# Patient Record
Sex: FEMALE | Race: WHITE | NOT HISPANIC OR LATINO | Employment: FULL TIME | URBAN - METROPOLITAN AREA
[De-identification: names, ages, dates, MRNs, and addresses within clinical notes are randomized per-mention and may not be internally consistent; named-entity substitution may affect disease eponyms.]

---

## 2017-12-19 ENCOUNTER — TRANSFERRED RECORDS (OUTPATIENT)
Dept: HEALTH INFORMATION MANAGEMENT | Facility: CLINIC | Age: 57
End: 2017-12-19

## 2017-12-19 LAB — PAP-ABSTRACT: NORMAL

## 2020-07-28 ENCOUNTER — OFFICE VISIT (OUTPATIENT)
Dept: FAMILY MEDICINE | Facility: CLINIC | Age: 60
End: 2020-07-28
Payer: COMMERCIAL

## 2020-07-28 ENCOUNTER — TELEPHONE (OUTPATIENT)
Dept: FAMILY MEDICINE | Facility: CLINIC | Age: 60
End: 2020-07-28

## 2020-07-28 VITALS
DIASTOLIC BLOOD PRESSURE: 84 MMHG | BODY MASS INDEX: 20.65 KG/M2 | TEMPERATURE: 98.7 F | HEIGHT: 69 IN | HEART RATE: 74 BPM | SYSTOLIC BLOOD PRESSURE: 144 MMHG | RESPIRATION RATE: 14 BRPM | OXYGEN SATURATION: 98 % | WEIGHT: 139.4 LBS

## 2020-07-28 DIAGNOSIS — I10 ESSENTIAL HYPERTENSION, BENIGN: ICD-10-CM

## 2020-07-28 DIAGNOSIS — N95.1 PERIMENOPAUSAL: ICD-10-CM

## 2020-07-28 DIAGNOSIS — L71.0 PERIORAL DERMATITIS: ICD-10-CM

## 2020-07-28 DIAGNOSIS — R21 RASH: ICD-10-CM

## 2020-07-28 DIAGNOSIS — F41.9 ANXIETY: ICD-10-CM

## 2020-07-28 DIAGNOSIS — I10 ESSENTIAL HYPERTENSION, BENIGN: Primary | ICD-10-CM

## 2020-07-28 DIAGNOSIS — Z12.39 ENCOUNTER FOR SCREENING FOR MALIGNANT NEOPLASM OF BREAST: ICD-10-CM

## 2020-07-28 PROCEDURE — 36415 COLL VENOUS BLD VENIPUNCTURE: CPT | Performed by: FAMILY MEDICINE

## 2020-07-28 PROCEDURE — 99203 OFFICE O/P NEW LOW 30 MIN: CPT | Performed by: FAMILY MEDICINE

## 2020-07-28 PROCEDURE — 80048 BASIC METABOLIC PNL TOTAL CA: CPT | Performed by: FAMILY MEDICINE

## 2020-07-28 RX ORDER — LOSARTAN POTASSIUM 25 MG/1
12.5 TABLET ORAL DAILY
Qty: 30 TABLET | Refills: 0 | Status: SHIPPED | OUTPATIENT
Start: 2020-07-28 | End: 2021-02-24

## 2020-07-28 RX ORDER — SPIRONOLACTONE 50 MG/1
50 TABLET, FILM COATED ORAL DAILY
Qty: 30 TABLET | Refills: 0 | Status: SHIPPED | OUTPATIENT
Start: 2020-07-28 | End: 2020-07-28

## 2020-07-28 RX ORDER — LOSARTAN POTASSIUM 25 MG/1
25 TABLET ORAL DAILY
Qty: 30 TABLET | Refills: 0 | Status: SHIPPED | OUTPATIENT
Start: 2020-07-28 | End: 2020-07-28

## 2020-07-28 RX ORDER — LOSARTAN POTASSIUM 25 MG/1
12.5 TABLET ORAL DAILY
Qty: 30 TABLET | Refills: 0 | Status: SHIPPED | OUTPATIENT
Start: 2020-07-28 | End: 2020-07-28

## 2020-07-28 RX ORDER — LOSARTAN POTASSIUM 25 MG/1
25 TABLET ORAL DAILY
COMMUNITY
End: 2020-07-28

## 2020-07-28 RX ORDER — SPIRONOLACTONE 50 MG/1
50 TABLET, FILM COATED ORAL DAILY
Qty: 30 TABLET | Refills: 0 | Status: SHIPPED | OUTPATIENT
Start: 2020-07-28 | End: 2020-09-09

## 2020-07-28 ASSESSMENT — ANXIETY QUESTIONNAIRES
5. BEING SO RESTLESS THAT IT IS HARD TO SIT STILL: SEVERAL DAYS
7. FEELING AFRAID AS IF SOMETHING AWFUL MIGHT HAPPEN: SEVERAL DAYS
GAD7 TOTAL SCORE: 9
6. BECOMING EASILY ANNOYED OR IRRITABLE: SEVERAL DAYS
1. FEELING NERVOUS, ANXIOUS, OR ON EDGE: MORE THAN HALF THE DAYS
IF YOU CHECKED OFF ANY PROBLEMS ON THIS QUESTIONNAIRE, HOW DIFFICULT HAVE THESE PROBLEMS MADE IT FOR YOU TO DO YOUR WORK, TAKE CARE OF THINGS AT HOME, OR GET ALONG WITH OTHER PEOPLE: SOMEWHAT DIFFICULT
2. NOT BEING ABLE TO STOP OR CONTROL WORRYING: SEVERAL DAYS
3. WORRYING TOO MUCH ABOUT DIFFERENT THINGS: MORE THAN HALF THE DAYS

## 2020-07-28 ASSESSMENT — PATIENT HEALTH QUESTIONNAIRE - PHQ9: 5. POOR APPETITE OR OVEREATING: SEVERAL DAYS

## 2020-07-28 ASSESSMENT — PAIN SCALES - GENERAL: PAINLEVEL: NO PAIN (0)

## 2020-07-28 ASSESSMENT — MIFFLIN-ST. JEOR: SCORE: 1277.72

## 2020-07-28 NOTE — TELEPHONE ENCOUNTER
A.S.    Pharmacy called.  See message below.    Need clarification on Losartan directions.    Thanks,  Cassia Shah RN

## 2020-07-28 NOTE — LETTER
July 31, 2020      Aziza Vegas  [address]  [address]  [place] [address]        Dear ,    We are writing to inform you of your test results.    Dear Aziza Washington meeting you in recent clinic encounter .     Normal electrolytes and kidney function tests.   Glucose is slightly high but since you were not fasting- it can be repeated in follow up     Please keep us posted with questions or concerns .       Best Regards,     Yen Torres MD   [facility]   [phone]     Resulted Orders   Basic metabolic panel  (Ca, Cl, CO2, Creat, Gluc, K, Na, BUN)   Result Value Ref Range    Sodium 137 133 - 144 mmol/L    Potassium 4.0 3.4 - 5.3 mmol/L    Chloride 105 94 - 109 mmol/L    Carbon Dioxide 25 20 - 32 mmol/L    Anion Gap 7 3 - 14 mmol/L    Glucose 103 (H) 70 - 99 mg/dL    Urea Nitrogen 13 7 - 30 mg/dL    Creatinine 0.65 0.52 - 1.04 mg/dL    GFR Estimate >90 >60 mL/min/[1.73_m2]      Comment:      Non  GFR Calc  Starting 12/18/2018, serum creatinine based estimated GFR (eGFR) will be   calculated using the Chronic Kidney Disease Epidemiology Collaboration   (CKD-EPI) equation.      GFR Estimate If Black >90 >60 mL/min/[1.73_m2]      Comment:       GFR Calc  Starting 12/18/2018, serum creatinine based estimated GFR (eGFR) will be   calculated using the Chronic Kidney Disease Epidemiology Collaboration   (CKD-EPI) equation.      Calcium 9.2 8.5 - 10.1 mg/dL       If you have any questions or concerns, please call the clinic at the number listed above.       Sincerely,        Yen Torres MD

## 2020-07-28 NOTE — PATIENT INSTRUCTIONS
UMP: Dermatology Clinic [facility] [phone]   [email or web address]   FHN:  [facility] [phone]   [email or web address]  FHN: [facility] [phone]  [email or web address]    PLEASE CALL TO SCHEDULE YOUR MAMMOGRAM  [facility] [phone]  [facility] [phone]  [facility]   [phone]

## 2020-07-28 NOTE — TELEPHONE ENCOUNTER
Reason for Call:  Medication or medication refill:    Do you use a [facility]?  Name of the pharmacy and phone number for the current request:  [id number] [place] [address] [address] AT [facility]    Name of the medication requested: losartan (COZAAR) 25 MG tablet   &  spironolactone (ALDACTONE) 50 MG tablet     Other request: Pt states that we send the above Rx's to the wrong pharma. Please send to the listed pharmacy    Can we leave a detailed message on this number? YES    Phone number patient can be reached at: Cell number on file:    Telephone Information:   Mobile [phone]     Best Time: any    Call taken on 7/28/2020 at 4:11 PM by Yuko Babb

## 2020-07-28 NOTE — PROGRESS NOTES
"Subjective     Aziza Vegas is a 59 year old female who presents to clinic today for the following health issues:    HPI       New Patient/Transfer of Care  Patient is new to [facility], was previously being seen at [facility], please see care everywhere for records.  Patient is requesting refills on spironolactone and losartan    Losartan added 3 yrs ago to control Blood pressure .takes 1/2 tab of 25 mg- and that helps with Blood pressure. No side effects  Needs refill.        She reports, also need refill on spirolactone 50 mg once daily     she been on spironolactone for Blood pressure for many years  and then derm also advised it to be continued  to keep ?skin clear. No acne    Has perioral dermatitis- responded to doxycyline but does not want to continue- now flares up time to time     Use doxy for perioral dermatitis it helped but would like not that to be continued    A lesion on - right nasal bridge- a small lesion- that she picks, and it bleeds and never goes away for past 6 months     No periods since past 6 months  Noted more anxiety than before. Wondering if related to hormones. Able to manage anxiety ok. As it is not debiitating  Not intersted in counsleing  And has very active life style        BP Readings from Last 3 Encounters:   07/28/20 (!) 144/84   03/13/15 121/89    Wt Readings from Last 3 Encounters:   07/28/20 63.2 kg (139 lb 6.4 oz)   03/13/15 57.2 kg (126 lb)                    Reviewed and updated as needed this visit by Provider  Meds  Problems         Review of Systems   Constitutional, HEENT, cardiovascular, pulmonary, GI, , musculoskeletal, neuro, skin, endocrine and psych systems are negative, except as otherwise noted.      Objective    BP (!) 144/84 (BP Location: Left arm, Patient Position: Sitting, Cuff Size: Adult Regular)   Pulse 74   Temp 98.7  F (37.1  C) (Oral)   Resp 14   Ht 1.746 m (5' 8.75\")   Wt 63.2 kg (139 lb 6.4 oz)   LMP  (LMP Unknown)   SpO2 98%  "  BMI 20.74 kg/m    Body mass index is 20.74 kg/m .  Physical Exam   GENERAL: healthy, alert and no distress  NECK: no adenopathy, no asymmetry, masses, or scars and thyroid normal to palpation  RESP: lungs clear to auscultation - no rales, rhonchi or wheezes  CV: regular rate and rhythm, normal S1 S2, no S3 or S4, no murmur, click or rub, no peripheral edema and peripheral pulses strong  ABDOMEN: soft, nontender, no hepatosplenomegaly, no masses and bowel sounds normal  MS: no gross musculoskeletal defects noted, no edema  Psych: Alert and oriented times 3; coherent speech, normal   rate and volume, able to articulate logical thoughts, able   to abstract reason, no tangential thoughts, no hallucinations   or delusions   THOMPSON-7 SCORE 7/28/2020   Total Score 9         Diagnostic Test Results:  Labs reviewed in Epic  No results found for this or any previous visit (from the past 24 hour(s)).        Assessment & Plan       ICD-10-CM    1. Essential hypertension, benign  I10 Basic metabolic panel  (Ca, Cl, CO2, Creat, Gluc, K, Na, BUN)     spironolactone (ALDACTONE) 50 MG tablet     losartan (COZAAR) 25 MG tablet     DISCONTINUED: losartan (COZAAR) 25 MG tablet    uncontrolled- missed medication today- will resume spinolactone and losratan as before. FUP in 2-4 weeks.   2. Rash  R21 DERMATOLOGY REFERRAL    right nasal bridge- a small lesion- that she picks, and it bleeds and never goes away for past 6 months - I have advised ot be seen by DERM for biopsy & further   3. Perioral dermatitis  L71.0 DERMATOLOGY REFERRAL    Doxy helped but does not want to continue   4. Anxiety  F41.9     does not want counseling  will try on own- OTC herbal based medicine and keep us posted with unresolved concerns   5. Encounter for screening for malignant neoplasm of breast  Z12.39 MA Screen Bilateral w/Azael    screening mammogram in august   6. Perimenopausal  N95.1    58 yo female , known hypertension- refill given for both losrartan  12.5 mg once daily and spironolactone 50 mg once daily .  I think in future- Blood pressure can be controlled with just increasing spirolactone to 100 mg once daily and stop losartan- patient declined the change today  Will check baseline blood test and follow up in 2-4 weeks. Need complete physical as well           Regular exercise    Return in about 2 weeks (around 8/11/2020) for routine physical.    Yen Torres MD  [facility]

## 2020-07-28 NOTE — TELEPHONE ENCOUNTER
Reason for Call:  Medication or medication refill:    Do you use a [facility]?  Name of the pharmacy and phone number for the current request:      [facility][id number] - [place], [place] - [address] AT [facility] [address]      Name of the medication requested:   losartan (COZAAR) 25 MG tablet  30 tablet       Other request:   Clarify instructions  Sig - Route: Take 1 tablet (25 mg) by mouth daily 1/2 tablet daily - Oral   Can we leave a detailed message on this number? YES    Phone number patient's pharmacy can be reached at: Other phone number:  [facility] [phone]    Best Time: any    Call taken on 7/28/2020 at 1:16 PM by Emily Monroy

## 2020-07-29 LAB
ANION GAP SERPL CALCULATED.3IONS-SCNC: 7 MMOL/L (ref 3–14)
BUN SERPL-MCNC: 13 MG/DL (ref 7–30)
CALCIUM SERPL-MCNC: 9.2 MG/DL (ref 8.5–10.1)
CHLORIDE SERPL-SCNC: 105 MMOL/L (ref 94–109)
CO2 SERPL-SCNC: 25 MMOL/L (ref 20–32)
CREAT SERPL-MCNC: 0.65 MG/DL (ref 0.52–1.04)
GFR SERPL CREATININE-BSD FRML MDRD: >90 ML/MIN/{1.73_M2}
GLUCOSE SERPL-MCNC: 103 MG/DL (ref 70–99)
POTASSIUM SERPL-SCNC: 4 MMOL/L (ref 3.4–5.3)
SODIUM SERPL-SCNC: 137 MMOL/L (ref 133–144)

## 2020-07-29 ASSESSMENT — ANXIETY QUESTIONNAIRES: GAD7 TOTAL SCORE: 9

## 2020-07-29 NOTE — RESULT ENCOUNTER NOTE
Send lab & letter    Dear Aziza Washington meeting you in recent clinic encounter .    Normal electrolytes and kidney function tests.  Glucose is slightly high but since you were not fasting- it can be repeated in follow up     Please keep us posted with questions or concerns .      Best Regards,    Yen Torres MD  [facility]  [phone]

## 2020-09-08 DIAGNOSIS — I10 ESSENTIAL HYPERTENSION, BENIGN: ICD-10-CM

## 2020-09-09 RX ORDER — SPIRONOLACTONE 50 MG/1
TABLET, FILM COATED ORAL
Qty: 30 TABLET | Refills: 0 | Status: SHIPPED | OUTPATIENT
Start: 2020-09-09 | End: 2020-10-21

## 2020-09-09 NOTE — TELEPHONE ENCOUNTER
A.S.    Patient called back.  States she hasn't checked her blood pressure.  Does not have a machine at home.    Cassia Shah RN

## 2020-09-09 NOTE — TELEPHONE ENCOUNTER
FS,   Please advise in A.S' absence  Routing refill request to provider for review/approval because:  Last B/P above goal of 140/90  Gay GRAHAM RN

## 2020-09-10 NOTE — TELEPHONE ENCOUNTER
Left non detailed VM for pt asking that they callback and schedule nurse only or appt with PCP  Gay GRAHAM RN

## 2020-09-10 NOTE — TELEPHONE ENCOUNTER
Please advise  return office visit with provider -to make sure Blood pressure ok. Was high in OV-July 28.  She can make nurse only appointment as well- but if Blood pressure not at target- will need provider encounter. Thanks

## 2020-10-21 DIAGNOSIS — I10 ESSENTIAL HYPERTENSION, BENIGN: ICD-10-CM

## 2020-10-21 NOTE — TELEPHONE ENCOUNTER
Reason for Call:  Medication or medication refill:    Do you use a [facility]?  Name of the pharmacy and phone number for the current request:  [facility][id number] - [place], [place] [address] [address] AT [facility] [address]    Name of the medication requested: spironolactone (ALDACTONE) 50 MG tablet     Other request: Pt is wondering why she needs to have an chanda't to get this filled. Ideally does not want to have an chanda't. Is out of Rx and needs it asap    Can we leave a detailed message on this number? YES    Phone number patient can be reached at: Cell number on file:    Telephone Information:   Mobile [phone]     Best Time: any    Call taken on 10/21/2020 at 10:06 AM by Yuko Babb

## 2020-10-22 NOTE — TELEPHONE ENCOUNTER
ELENA.S  Please see below note  Spoke with pt and she says she has been checking her BPs at work and they have been 124/79 to 129/83. Would like to avoid coming in due to COVID. Let her know about video visits as an FYI.  Last OV 7/28/20    Pt is out of medication  Ok to refill spironolactone for 90?  Pended, Please approve if appropriate  Tanvi Coyle RN

## 2020-10-23 RX ORDER — SPIRONOLACTONE 50 MG/1
50 TABLET, FILM COATED ORAL DAILY
Qty: 90 TABLET | Refills: 0 | Status: SHIPPED | OUTPATIENT
Start: 2020-10-23 | End: 2021-01-19

## 2020-11-08 ENCOUNTER — HEALTH MAINTENANCE LETTER (OUTPATIENT)
Age: 60
End: 2020-11-08

## 2021-01-19 ENCOUNTER — TELEPHONE (OUTPATIENT)
Dept: FAMILY MEDICINE | Facility: CLINIC | Age: 61
End: 2021-01-19

## 2021-01-19 DIAGNOSIS — I10 ESSENTIAL HYPERTENSION, BENIGN: ICD-10-CM

## 2021-01-19 RX ORDER — SPIRONOLACTONE 50 MG/1
50 TABLET, FILM COATED ORAL DAILY
Qty: 30 TABLET | Refills: 0 | Status: SHIPPED | OUTPATIENT
Start: 2021-01-19 | End: 2021-02-24

## 2021-01-19 RX ORDER — SPIRONOLACTONE 50 MG/1
TABLET, FILM COATED ORAL
Start: 2021-01-19

## 2021-01-19 NOTE — TELEPHONE ENCOUNTER
Spironolactone:  Medication is being filled for 1 time refill only due to:  Patient needs to be seen because due for physical.     Cassia Shah RN

## 2021-01-19 NOTE — TELEPHONE ENCOUNTER
Reason for Call:  Medication or medication refill:spironolactone going to [place] for 2 weeks and will schedule when she returns. Please fill. Request a call back    Do you use a [facility]?  Name of the pharmacy and phone number for the current request:  [facility][id number] [place] [address] [address] AT [facility]    Name of the medication requested:     Other request:     Can we leave a detailed message on this number? YES    Phone number patient can be reached at: Home number on file [phone] (home)    Best Time: any    Call taken on 1/19/2021 at 10:44 AM by Mary Alice Burch

## 2021-02-24 ENCOUNTER — OFFICE VISIT (OUTPATIENT)
Dept: FAMILY MEDICINE | Facility: CLINIC | Age: 61
End: 2021-02-24
Payer: COMMERCIAL

## 2021-02-24 VITALS
SYSTOLIC BLOOD PRESSURE: 129 MMHG | DIASTOLIC BLOOD PRESSURE: 82 MMHG | RESPIRATION RATE: 20 BRPM | WEIGHT: 141.5 LBS | TEMPERATURE: 97.5 F | OXYGEN SATURATION: 99 % | BODY MASS INDEX: 20.96 KG/M2 | HEIGHT: 69 IN | HEART RATE: 75 BPM

## 2021-02-24 DIAGNOSIS — Z12.31 ENCOUNTER FOR SCREENING MAMMOGRAM FOR MALIGNANT NEOPLASM OF BREAST: ICD-10-CM

## 2021-02-24 DIAGNOSIS — Z12.4 SCREENING FOR CERVICAL CANCER: ICD-10-CM

## 2021-02-24 DIAGNOSIS — Z12.11 COLON CANCER SCREENING: ICD-10-CM

## 2021-02-24 DIAGNOSIS — I10 ESSENTIAL HYPERTENSION, BENIGN: Primary | ICD-10-CM

## 2021-02-24 DIAGNOSIS — R23.2 HOT FLASHES: ICD-10-CM

## 2021-02-24 LAB
ANION GAP SERPL CALCULATED.3IONS-SCNC: 3 MMOL/L (ref 3–14)
BUN SERPL-MCNC: 12 MG/DL (ref 7–30)
CALCIUM SERPL-MCNC: 10.1 MG/DL (ref 8.5–10.1)
CHLORIDE SERPL-SCNC: 105 MMOL/L (ref 94–109)
CHOLEST SERPL-MCNC: 234 MG/DL
CO2 SERPL-SCNC: 28 MMOL/L (ref 20–32)
CREAT SERPL-MCNC: 0.69 MG/DL (ref 0.52–1.04)
GFR SERPL CREATININE-BSD FRML MDRD: >90 ML/MIN/{1.73_M2}
GLUCOSE SERPL-MCNC: 99 MG/DL (ref 70–99)
HDLC SERPL-MCNC: 120 MG/DL
LDLC SERPL CALC-MCNC: 100 MG/DL
NONHDLC SERPL-MCNC: 114 MG/DL
POTASSIUM SERPL-SCNC: 4.3 MMOL/L (ref 3.4–5.3)
SODIUM SERPL-SCNC: 136 MMOL/L (ref 133–144)
TRIGL SERPL-MCNC: 71 MG/DL
TSH SERPL DL<=0.005 MIU/L-ACNC: 0.89 MU/L (ref 0.4–4)

## 2021-02-24 PROCEDURE — 99214 OFFICE O/P EST MOD 30 MIN: CPT | Performed by: FAMILY MEDICINE

## 2021-02-24 PROCEDURE — 80061 LIPID PANEL: CPT | Performed by: FAMILY MEDICINE

## 2021-02-24 PROCEDURE — 36415 COLL VENOUS BLD VENIPUNCTURE: CPT | Performed by: FAMILY MEDICINE

## 2021-02-24 PROCEDURE — 84443 ASSAY THYROID STIM HORMONE: CPT | Performed by: FAMILY MEDICINE

## 2021-02-24 PROCEDURE — 80048 BASIC METABOLIC PNL TOTAL CA: CPT | Performed by: FAMILY MEDICINE

## 2021-02-24 RX ORDER — SPIRONOLACTONE 50 MG/1
50 TABLET, FILM COATED ORAL DAILY
Qty: 90 TABLET | Refills: 3 | Status: SHIPPED | OUTPATIENT
Start: 2021-02-24 | End: 2021-09-21

## 2021-02-24 ASSESSMENT — MIFFLIN-ST. JEOR: SCORE: 1282.25

## 2021-02-24 NOTE — PROGRESS NOTES
Assessment & Plan     Essential hypertension, benign  Controlled, continue current medication   - spironolactone (ALDACTONE) 50 MG tablet  Dispense: 90 tablet; Refill: 3  - Basic metabolic panel  (Ca, Cl, CO2, Creat, Gluc, K, Na, BUN)  - Lipid Profile (Chol, Trig, HDL, LDL calc)    Encounter for screening mammogram for malignant neoplasm of breast  - MA Screen Bilateral w/Azael  Counseling provided for breast cancer screening.    Perimenopausal / menopausal hot flashes  last periods 11 month ago. hot flahes angela at night- tolerable  So far.  Discussed to wear layers and stay active, over the counter phyt oestrogen- see if that helps  Follow up as needed    Took thyroid meds in early years and since then none    Colon cancer screening  Colonoscopy up to date  10/2015, next due 03/2025 -    Screening for cervical cancer  HPV- & NIL pap 12/2017- recheck ok in 12/22      30 minutes spent on the date of the encounter doing chart review, history and exam, documentation and further activities as noted above    Return in about 1 year (around 2/24/2022) for routine physical.    Yen Torres MD  [facility]    Dianna Ervin is a 60 year old who presents for the following health issues     HPI     Medication Followup of spironolactone    Taking Medication as prescribed: yes    Side Effects:  None    Medication Helping Symptoms:  yes      Hypertension Follow-up      Do you check your blood pressure regularly outside of the clinic? Yes     Are you following a low salt diet? Yes    Are your blood pressures ever more than 140 on the top number (systolic) OR more   than 90 on the bottom number (diastolic), for example 140/90? No      How many servings of fruits and vegetables do you eat daily?  2-3    On average, how many sweetened beverages do you drink each day (Examples: soda, juice, sweet tea, etc.  Do NOT count diet or artificially sweetened beverages)?   0    How many days per week do you  "exercise enough to make your heart beat faster? 5    How many minutes a day do you exercise enough to make your heart beat faster? 60 or more    How many days per week do you miss taking your medication? 0       Pt did have pap 12/19/17 at [facility] - sent to abstract.         Review of Systems  No periods for 11 months . Hot flashes on and off- 1 yr.  Constitutional, HEENT, cardiovascular, pulmonary, GI, , musculoskeletal, neuro, skin, endocrine and psych systems are negative, except as otherwise noted.      Objective    /82 (Patient Position: Sitting, Cuff Size: Adult Regular)   Pulse 75   Temp 97.5  F (36.4  C) (Temporal)   Resp 20   Ht 1.746 m (5' 8.75\")   Wt 64.2 kg (141 lb 8 oz)   SpO2 99%   BMI 21.05 kg/m    Body mass index is 21.05 kg/m .  Physical Exam   GENERAL: healthy, alert and no distress  NECK: no adenopathy, no asymmetry, masses, or scars and thyroid normal to palpation  RESP: lungs clear to auscultation - no rales, rhonchi or wheezes  CV: regular rate and rhythm, normal S1 S2, no S3 or S4, no murmur, click or rub, no peripheral edema and peripheral pulses strong  ABDOMEN: soft, nontender, no hepatosplenomegaly, no masses and bowel sounds normal  PSYCH: mentation appears normal, affect normal/bright                "

## 2021-02-25 NOTE — RESULT ENCOUNTER NOTE
Hi    -Cholesterol levels  Triglycerides)are normal.  LDL (bad ) & cholesterol  slightly high but good diet and excercise is all you need   ADVISE: rechecking in 1 year.   -Kidney function is normal (Cr, GFR), Sodium is normal, Potassium is normal, Calcium is normal, Glucose is normal.   -TSH (thyroid stimulating hormone) level is normal which indicates normal thyroid function.

## 2021-03-10 ENCOUNTER — HOSPITAL ENCOUNTER (OUTPATIENT)
Dept: MAMMOGRAPHY | Facility: CLINIC | Age: 61
Discharge: HOME | End: 2021-03-10
Attending: FAMILY MEDICINE | Admitting: FAMILY MEDICINE
Payer: COMMERCIAL

## 2021-03-10 DIAGNOSIS — Z12.31 ENCOUNTER FOR SCREENING MAMMOGRAM FOR MALIGNANT NEOPLASM OF BREAST: ICD-10-CM

## 2021-03-10 PROCEDURE — 77063 BREAST TOMOSYNTHESIS BI: CPT

## 2021-03-27 ENCOUNTER — HEALTH MAINTENANCE LETTER (OUTPATIENT)
Age: 61
End: 2021-03-27

## 2021-08-30 ENCOUNTER — LAB REQUISITION (OUTPATIENT)
Dept: LAB | Facility: CLINIC | Age: 61
End: 2021-08-30

## 2021-08-30 LAB — HBV SURFACE AB SERPL IA-ACNC: 26.8 M[IU]/ML

## 2021-08-30 PROCEDURE — 86706 HEP B SURFACE ANTIBODY: CPT | Performed by: INTERNAL MEDICINE

## 2021-08-30 PROCEDURE — 86481 TB AG RESPONSE T-CELL SUSP: CPT | Performed by: INTERNAL MEDICINE

## 2021-08-31 LAB
GAMMA INTERFERON BACKGROUND BLD IA-ACNC: 0.05 IU/ML
M TB IFN-G BLD-IMP: NEGATIVE
M TB IFN-G CD4+ BCKGRND COR BLD-ACNC: 9.95 IU/ML
MITOGEN IGNF BCKGRD COR BLD-ACNC: 0.19 IU/ML
MITOGEN IGNF BCKGRD COR BLD-ACNC: 0.32 IU/ML
QUANTIFERON MITOGEN: 10 IU/ML
QUANTIFERON NIL TUBE: 0.05 IU/ML
QUANTIFERON TB1 TUBE: 0.24 IU/ML
QUANTIFERON TB2 TUBE: 0.37

## 2021-09-11 ENCOUNTER — HEALTH MAINTENANCE LETTER (OUTPATIENT)
Age: 61
End: 2021-09-11

## 2021-09-20 NOTE — PATIENT INSTRUCTIONS
We can check for diabetes and high cholesterol if you come fasting for 10 hours during which time you can drink water only but no other food or fluids, including snacks. You do need lab only appointment so call ahead of time.          Call to schedule your imaging:    Critical access hospital [phone]    [facility] [phone]      Add vitamin d 1000 international unit with calcium 1500 mg once daily or bedtime   Stay active- yoga/ weight bearing excercise good for bone health and general well being    Preventive Health Recommendations  Female Ages 50 - 64    Yearly exam: See your health care provider every year in order to  o Review health changes.   o Discuss preventive care.    o Review your medicines if your doctor has prescribed any.      Get a Pap test every three years (unless you have an abnormal result and your provider advises testing more often).    If you get Pap tests with HPV test, you only need to test every 5 years, unless you have an abnormal result.     You do not need a Pap test if your uterus was removed (hysterectomy) and you have not had cancer.    You should be tested each year for STDs (sexually transmitted diseases) if you're at risk.     Have a mammogram every 1 to 2 years.    Have a colonoscopy at age 50, or have a yearly FIT test (stool test). These exams screen for colon cancer.      Have a cholesterol test every 5 years, or more often if advised.    Have a diabetes test (fasting glucose) every three years. If you are at risk for diabetes, you should have this test more often.     If you are at risk for osteoporosis (brittle bone disease), think about having a bone density scan (DEXA).    Shots: Get a flu shot each year. Get a tetanus shot every 10 years.    Nutrition:     Eat at least 5 servings of fruits and vegetables each day.    Eat whole-grain bread, whole-wheat pasta and brown rice instead of white grains and rice.    Get adequate Calcium and Vitamin D.      Lifestyle    Exercise at least 150 minutes a week (30 minutes a day, 5 days a week). This will help you control your weight and prevent disease.    Limit alcohol to one drink per day.    No smoking.     Wear sunscreen to prevent skin cancer.     See your dentist every six months for an exam and cleaning.    See your eye doctor every 1 to 2 years.

## 2021-09-20 NOTE — PROGRESS NOTES
SUBJECTIVE:   CC: Aziza Vegas is an 60 year old woman who presents for preventive health visit.       Patient has been advised of split billing requirements and indicates understanding: Yes  Healthy Habits:     Getting at least 3 servings of Calcium per day:  Yes    Bi-annual eye exam:  Yes    Dental care twice a year:  Yes    Sleep apnea or symptoms of sleep apnea:  None    Diet:  Low salt    Frequency of exercise:  4-5 days/week    Duration of exercise:  45-60 minutes    Taking medications regularly:  No    Barriers to taking medications:  None    Medication side effects:  Not applicable    PHQ-2 Total Score: 0    Additional concerns today:  Yes    Doing well  Likes yoga  Feels achy when does not do yoga  Ana Paula shoulder and knee, wondering if due to menopause.  menoapuse only 1 yr ago  Wondering about vitamins.  Active life style  New job at [employer] as  since 09/2021,    She does ring her own self breast exam- because of dense breast.  -------------------------------------    Today's PHQ-2 Score:   PHQ-2 ( 1999 Pfizer) 9/21/2021   Q1: Little interest or pleasure in doing things 0   Q2: Feeling down, depressed or hopeless 0   PHQ-2 Score 0   Q1: Little interest or pleasure in doing things Not at all   Q2: Feeling down, depressed or hopeless Not at all   PHQ-2 Score 0       Abuse: Current or Past (Physical, Sexual or Emotional) - No  Do you feel safe in your environment? Yes    Have you ever done Advance Care Planning? (For example, a Health Directive, POLST, or a discussion with a medical provider or your loved ones about your wishes): No, advance care planning information given to patient to review.  Patient declined advance care planning discussion at this time.    Social History     Tobacco Use     Smoking status: Never Smoker     Smokeless tobacco: Never Used   Substance Use Topics     Alcohol use: Yes     Comment: 2 glasses 3 times a week         Alcohol Use 9/21/2021   Prescreen: >3  drinks/day or >7 drinks/week? No       Reviewed orders with patient.  Reviewed health maintenance and updated orders accordingly - Yes  BP Readings from Last 3 Encounters:   09/21/21 124/75   02/24/21 129/82   07/28/20 (!) 144/84    Wt Readings from Last 3 Encounters:   09/21/21 65.1 kg (143 lb 9.6 oz)   02/24/21 64.2 kg (141 lb 8 oz)   07/28/20 63.2 kg (139 lb 6.4 oz)                    Breast Cancer Screening:  Any new diagnosis of family breast, ovarian, or bowel cancer? No    FHS-7: No flowsheet data found.      Pertinent mammograms are reviewed under the imaging tab.    History of abnormal Pap smear: NO - age 30-65 PAP every 5 years with negative HPV co-testing recommended     Reviewed and updated as needed this visit by clinical staff  Tobacco  Allergies  Meds  Problems  Med Hx  Surg Hx  Fam Hx  Soc Hx          Reviewed and updated as needed this visit by Provider  Tobacco  Allergies  Meds  Problems  Med Hx  Surg Hx  Fam Hx             Review of Systems   Constitutional: Negative for chills and fever.   HENT: Negative for congestion, ear pain, hearing loss and sore throat.    Eyes: Negative for pain and visual disturbance.   Respiratory: Negative for cough and shortness of breath.    Cardiovascular: Negative for chest pain, palpitations and peripheral edema.   Gastrointestinal: Negative for abdominal pain, constipation, diarrhea, heartburn, hematochezia and nausea.   Breasts:  Negative for tenderness, breast mass and discharge.   Genitourinary: Negative for dysuria, frequency, genital sores, hematuria, pelvic pain, urgency, vaginal bleeding and vaginal discharge.   Musculoskeletal: Positive for arthralgias. Negative for joint swelling and myalgias.   Skin: Negative for rash.   Neurological: Negative for dizziness, weakness, headaches and paresthesias.   Psychiatric/Behavioral: Negative for mood changes. The patient is not nervous/anxious.      OBJECTIVE:   /75   Pulse 73   Temp 97.3  F  "(36.3  C) (Temporal)   Ht 1.745 m (5' 8.7\")   Wt 65.1 kg (143 lb 9.6 oz)   SpO2 99%   BMI 21.39 kg/m    Physical Exam  GENERAL: healthy, alert and no distress  EYES: Eyes grossly normal to inspection, PERRL and conjunctivae and sclerae normal  HENT: ear canals and TM's normal, nose and mouth without ulcers or lesions  NECK: no adenopathy, no asymmetry, masses, or scars and thyroid normal to palpation  RESP: lungs clear to auscultation - no rales, rhonchi or wheezes  BREAST: normal without masses, tenderness or nipple discharge and no palpable axillary masses or adenopathy  CV: regular rate and rhythm, normal S1 S2, no S3 or S4, no murmur, click or rub, no peripheral edema and peripheral pulses strong  ABDOMEN: soft, nontender, no hepatosplenomegaly, no masses and bowel sounds normal   (female): normal female external genitalia, normal urethral meatus, vaginal mucosa pink, moist, well rugated, and normal cervix/adnexa/uterus without masses or discharge. Pap smear today  MS: no gross musculoskeletal defects noted, no edema  SKIN: no suspicious lesions or rashes  NEURO: Normal strength and tone, mentation intact and speech normal  PSYCH: mentation appears normal, affect normal/bright    Diagnostic Test Results:  Labs reviewed in Epic  No results found for this or any previous visit (from the past 24 hour(s)).    ASSESSMENT/PLAN:   Aziza was seen today for physical.    Diagnoses and all orders for this visit:    Routine general medical examination at a health care facility  Comments:  PAP IF NILM- REPEAT IN 5 YRS.  MMMOGRAM 3/21- ANNUALLLY  COLONOSCOPY-3/2015- REPEAY IN 3/25  FLU VACCINE AT WORK.SHINGRAX AT PHARMACY.  Need for dex scan discussed. Might do it next yr    Orders:  -     REVIEW OF HEALTH MAINTENANCE PROTOCOL ORDERS    Essential hypertension, benign  Comments: well controlled. Medications changes made.  She had added 12.5 mg of losartan and I think its best to maximize the dose of spirolactone first- " "before adding another anti-hypertension . SHE HAS BP CUFF AT HOME AND WILL KEEP US POSTED IF BP > 140/90   INCREASE SPIRONOLACTONE  MG  AND NO NEED FOR LOSARTAN    Orders:  -     Discontinue: losartan (COZAAR) 25 MG tablet; Take 0.5 tablets (12.5 mg) by mouth daily  -     Discontinue: spironolactone (ALDACTONE) 50 MG tablet; Take 1 tablet (50 mg) by mouth daily  -     spironolactone (ALDACTONE) 100 MG tablet; Take 1 tablet (100 mg) by mouth daily  -     Basic metabolic panel  (Ca, Cl, CO2, Creat, Gluc, K, Na, BUN)    Post menopausal problems  ENCOURAGED TO ADD VITAMIN D AT LEAST 1000 international unit(s) WITH CALCIUM 1500 MG BEDTIME   WILL CHECK LEVEL FOR VITAMIN D    WELL DISCUSS DEXA SCAN - WILL CONSIDER BISPHOSPHONATE IF OSTEOPOROSIS     Screening for cervical cancer  -     Pap Screen with HPV - recommended age 30 - 65 years      The ASCVD Risk score (Kobe HERNANDEZ Jr., et al., 2013) failed to calculate for the following reasons:    The valid HDL cholesterol range is 20 to 100 mg/dL  RECHECK ADVISED FASTING- NEXT YR   Recent Labs   Lab Test 02/24/21  1026   CHOL 234*      *   TRIG 71       Patient has been advised of split billing requirements and indicates understanding: Yes  COUNSELING:  Reviewed preventive health counseling, as reflected in patient instructions       Regular exercise       Healthy diet/nutrition       Vision screening       Hearing screening       Osteoporosis prevention/bone health    Estimated body mass index is 21.39 kg/m  as calculated from the following:    Height as of this encounter: 1.745 m (5' 8.7\").    Weight as of this encounter: 65.1 kg (143 lb 9.6 oz).        She reports that she has never smoked. She has never used smokeless tobacco.      Counseling Resources:  ATP IV Guidelines  Pooled Cohorts Equation Calculator  Breast Cancer Risk Calculator  BRCA-Related Cancer Risk Assessment: FHS-7 Tool  FRAX Risk Assessment  ICSI Preventive Guidelines  Dietary Guidelines for " Americans, 2010  USDA's MyPlate  ASA Prophylaxis  Lung CA Screening    Yen Torres MD  [facility]

## 2021-09-21 ENCOUNTER — OFFICE VISIT (OUTPATIENT)
Dept: FAMILY MEDICINE | Facility: CLINIC | Age: 61
End: 2021-09-21
Payer: COMMERCIAL

## 2021-09-21 VITALS
WEIGHT: 143.6 LBS | HEART RATE: 73 BPM | TEMPERATURE: 97.3 F | SYSTOLIC BLOOD PRESSURE: 124 MMHG | BODY MASS INDEX: 21.27 KG/M2 | DIASTOLIC BLOOD PRESSURE: 75 MMHG | OXYGEN SATURATION: 99 % | HEIGHT: 69 IN

## 2021-09-21 DIAGNOSIS — Z00.00 ROUTINE GENERAL MEDICAL EXAMINATION AT A HEALTH CARE FACILITY: Primary | ICD-10-CM

## 2021-09-21 DIAGNOSIS — N95.9 POST MENOPAUSAL PROBLEMS: ICD-10-CM

## 2021-09-21 DIAGNOSIS — I10 ESSENTIAL HYPERTENSION, BENIGN: ICD-10-CM

## 2021-09-21 DIAGNOSIS — Z13.820 SCREENING FOR OSTEOPOROSIS: ICD-10-CM

## 2021-09-21 DIAGNOSIS — Z13.21 ENCOUNTER FOR VITAMIN DEFICIENCY SCREENING: ICD-10-CM

## 2021-09-21 DIAGNOSIS — Z12.4 SCREENING FOR CERVICAL CANCER: ICD-10-CM

## 2021-09-21 PROCEDURE — G0145 SCR C/V CYTO,THINLAYER,RESCR: HCPCS | Performed by: FAMILY MEDICINE

## 2021-09-21 PROCEDURE — 90471 IMMUNIZATION ADMIN: CPT | Performed by: FAMILY MEDICINE

## 2021-09-21 PROCEDURE — 90750 HZV VACC RECOMBINANT IM: CPT | Performed by: FAMILY MEDICINE

## 2021-09-21 PROCEDURE — 80048 BASIC METABOLIC PNL TOTAL CA: CPT | Performed by: FAMILY MEDICINE

## 2021-09-21 PROCEDURE — 80061 LIPID PANEL: CPT | Performed by: FAMILY MEDICINE

## 2021-09-21 PROCEDURE — 99396 PREV VISIT EST AGE 40-64: CPT | Mod: 25 | Performed by: FAMILY MEDICINE

## 2021-09-21 PROCEDURE — 82306 VITAMIN D 25 HYDROXY: CPT | Performed by: FAMILY MEDICINE

## 2021-09-21 PROCEDURE — 87624 HPV HI-RISK TYP POOLED RSLT: CPT | Performed by: FAMILY MEDICINE

## 2021-09-21 PROCEDURE — 36415 COLL VENOUS BLD VENIPUNCTURE: CPT | Performed by: FAMILY MEDICINE

## 2021-09-21 RX ORDER — LOSARTAN POTASSIUM 25 MG/1
12.5 TABLET ORAL DAILY
Qty: 90 TABLET | Refills: 1 | Status: SHIPPED | OUTPATIENT
Start: 2021-09-21 | End: 2021-09-21

## 2021-09-21 RX ORDER — SPIRONOLACTONE 100 MG/1
100 TABLET, FILM COATED ORAL DAILY
Qty: 90 TABLET | Refills: 3 | Status: SHIPPED | OUTPATIENT
Start: 2021-09-21 | End: 2022-10-06

## 2021-09-21 RX ORDER — LOSARTAN POTASSIUM 25 MG/1
0.5 TABLET ORAL DAILY
COMMUNITY
Start: 2020-06-16 | End: 2021-09-21

## 2021-09-21 RX ORDER — SPIRONOLACTONE 50 MG/1
50 TABLET, FILM COATED ORAL DAILY
Qty: 90 TABLET | Refills: 3 | Status: SHIPPED | OUTPATIENT
Start: 2021-09-21 | End: 2021-09-21

## 2021-09-21 ASSESSMENT — ENCOUNTER SYMPTOMS
FREQUENCY: 0
HEMATURIA: 0
HEADACHES: 0
HEARTBURN: 0
BREAST MASS: 0
PARESTHESIAS: 0
CONSTIPATION: 0
DIARRHEA: 0
ABDOMINAL PAIN: 0
COUGH: 0
MYALGIAS: 0
SHORTNESS OF BREATH: 0
NERVOUS/ANXIOUS: 0
FEVER: 0
WEAKNESS: 0
SORE THROAT: 0
DIZZINESS: 0
CHILLS: 0
HEMATOCHEZIA: 0
PALPITATIONS: 0
DYSURIA: 0
ARTHRALGIAS: 1
JOINT SWELLING: 0
NAUSEA: 0
EYE PAIN: 0

## 2021-09-21 ASSESSMENT — MIFFLIN-ST. JEOR: SCORE: 1291

## 2021-09-22 LAB
ANION GAP SERPL CALCULATED.3IONS-SCNC: 3 MMOL/L (ref 3–14)
BUN SERPL-MCNC: 13 MG/DL (ref 7–30)
CALCIUM SERPL-MCNC: 9.2 MG/DL (ref 8.5–10.1)
CHLORIDE BLD-SCNC: 105 MMOL/L (ref 94–109)
CHOLEST SERPL-MCNC: 255 MG/DL
CO2 SERPL-SCNC: 28 MMOL/L (ref 20–32)
CREAT SERPL-MCNC: 0.65 MG/DL (ref 0.52–1.04)
DEPRECATED CALCIDIOL+CALCIFEROL SERPL-MC: 30 UG/L (ref 20–75)
FASTING STATUS PATIENT QL REPORTED: NO
GFR SERPL CREATININE-BSD FRML MDRD: >90 ML/MIN/1.73M2
GLUCOSE BLD-MCNC: 90 MG/DL (ref 70–99)
HDLC SERPL-MCNC: 110 MG/DL
LDLC SERPL CALC-MCNC: 125 MG/DL
NONHDLC SERPL-MCNC: 145 MG/DL
POTASSIUM BLD-SCNC: 4.1 MMOL/L (ref 3.4–5.3)
SODIUM SERPL-SCNC: 136 MMOL/L (ref 133–144)
TRIGL SERPL-MCNC: 101 MG/DL

## 2021-09-23 LAB
BKR LAB AP GYN ADEQUACY: NORMAL
BKR LAB AP GYN INTERPRETATION: NORMAL
BKR LAB AP HPV REFLEX: NORMAL
BKR LAB AP PREVIOUS ABNORMAL: NORMAL
PATH REPORT.COMMENTS IMP SPEC: NORMAL
PATH REPORT.RELEVANT HX SPEC: NORMAL

## 2021-09-23 NOTE — RESULT ENCOUNTER NOTE
Hi      Great seeing you in recent clinic encounter  All labs look good- except for slightly elevated bad cholestrol LDL.    -Kidney function is normal (Cr, GFR), Sodium is normal, Potassium is normal, Calcium is normal, Glucose is normal.   -Vitamin D level is normal and getting 1000 IU daily in your diet or supplements is recommended.   -LDL(bad) cholesterol level is elevated which can increase your heart disease risk.  A diet high in fat and simple carbohydrates, genetics and being overweight can contribute to this. ADVISE: exercising 150 minutes of aerobic exercise per week (30 minutes for 5 days per week or 50 minutes for 3 days per week are options) and eating a low saturated fat/low carbohydrate diet are helpful to improve this. Recheck next yr.    Please keep us posted with questions or concerns .      Best Regards,    Yen Torres MD  [facility]  [phone]

## 2021-09-25 LAB
HUMAN PAPILLOMA VIRUS 16 DNA: NEGATIVE
HUMAN PAPILLOMA VIRUS 18 DNA: NEGATIVE
HUMAN PAPILLOMA VIRUS FINAL DIAGNOSIS: NORMAL
HUMAN PAPILLOMA VIRUS OTHER HR: NEGATIVE

## 2021-09-27 ENCOUNTER — PATIENT OUTREACH (OUTPATIENT)
Dept: FAMILY MEDICINE | Facility: CLINIC | Age: 61
End: 2021-09-27

## 2022-01-20 PROCEDURE — U0003 INFECTIOUS AGENT DETECTION BY NUCLEIC ACID (DNA OR RNA); SEVERE ACUTE RESPIRATORY SYNDROME CORONAVIRUS 2 (SARS-COV-2) (CORONAVIRUS DISEASE [COVID-19]), AMPLIFIED PROBE TECHNIQUE, MAKING USE OF HIGH THROUGHPUT TECHNOLOGIES AS DESCRIBED BY CMS-2020-01-R: HCPCS | Performed by: INTERNAL MEDICINE

## 2022-01-21 ENCOUNTER — LAB REQUISITION (OUTPATIENT)
Dept: LAB | Facility: CLINIC | Age: 62
End: 2022-01-21

## 2022-01-21 LAB — SARS-COV-2 RNA RESP QL NAA+PROBE: NEGATIVE

## 2022-05-11 ENCOUNTER — LAB REQUISITION (OUTPATIENT)
Dept: LAB | Facility: CLINIC | Age: 62
End: 2022-05-11

## 2022-05-11 PROCEDURE — U0003 INFECTIOUS AGENT DETECTION BY NUCLEIC ACID (DNA OR RNA); SEVERE ACUTE RESPIRATORY SYNDROME CORONAVIRUS 2 (SARS-COV-2) (CORONAVIRUS DISEASE [COVID-19]), AMPLIFIED PROBE TECHNIQUE, MAKING USE OF HIGH THROUGHPUT TECHNOLOGIES AS DESCRIBED BY CMS-2020-01-R: HCPCS | Performed by: INTERNAL MEDICINE

## 2022-05-12 LAB — SARS-COV-2 RNA RESP QL NAA+PROBE: NEGATIVE

## 2022-05-18 ENCOUNTER — LAB REQUISITION (OUTPATIENT)
Dept: LAB | Facility: CLINIC | Age: 62
End: 2022-05-18

## 2022-05-18 PROCEDURE — U0005 INFEC AGEN DETEC AMPLI PROBE: HCPCS | Performed by: INTERNAL MEDICINE

## 2022-05-19 LAB — SARS-COV-2 RNA RESP QL NAA+PROBE: NEGATIVE

## 2022-06-03 PROCEDURE — U0003 INFECTIOUS AGENT DETECTION BY NUCLEIC ACID (DNA OR RNA); SEVERE ACUTE RESPIRATORY SYNDROME CORONAVIRUS 2 (SARS-COV-2) (CORONAVIRUS DISEASE [COVID-19]), AMPLIFIED PROBE TECHNIQUE, MAKING USE OF HIGH THROUGHPUT TECHNOLOGIES AS DESCRIBED BY CMS-2020-01-R: HCPCS | Performed by: INTERNAL MEDICINE

## 2022-06-04 ENCOUNTER — LAB REQUISITION (OUTPATIENT)
Dept: LAB | Facility: CLINIC | Age: 62
End: 2022-06-04

## 2022-06-04 LAB — SARS-COV-2 RNA RESP QL NAA+PROBE: NEGATIVE

## 2022-06-09 ENCOUNTER — LAB REQUISITION (OUTPATIENT)
Dept: LAB | Facility: CLINIC | Age: 62
End: 2022-06-09

## 2022-06-09 PROCEDURE — U0003 INFECTIOUS AGENT DETECTION BY NUCLEIC ACID (DNA OR RNA); SEVERE ACUTE RESPIRATORY SYNDROME CORONAVIRUS 2 (SARS-COV-2) (CORONAVIRUS DISEASE [COVID-19]), AMPLIFIED PROBE TECHNIQUE, MAKING USE OF HIGH THROUGHPUT TECHNOLOGIES AS DESCRIBED BY CMS-2020-01-R: HCPCS | Performed by: INTERNAL MEDICINE

## 2022-06-10 LAB — SARS-COV-2 RNA RESP QL NAA+PROBE: NEGATIVE

## 2022-06-17 PROCEDURE — U0005 INFEC AGEN DETEC AMPLI PROBE: HCPCS | Performed by: INTERNAL MEDICINE

## 2022-06-18 ENCOUNTER — LAB REQUISITION (OUTPATIENT)
Dept: LAB | Facility: CLINIC | Age: 62
End: 2022-06-18

## 2022-06-18 LAB — SARS-COV-2 RNA RESP QL NAA+PROBE: NEGATIVE

## 2022-09-08 ENCOUNTER — PATIENT OUTREACH (OUTPATIENT)
Dept: FAMILY MEDICINE | Facility: CLINIC | Age: 62
End: 2022-09-08

## 2022-09-08 DIAGNOSIS — Z98.890 HISTORY OF LOOP ELECTRICAL EXCISION PROCEDURE (LEEP): ICD-10-CM

## 2022-10-05 DIAGNOSIS — I10 ESSENTIAL HYPERTENSION, BENIGN: ICD-10-CM

## 2022-10-06 RX ORDER — SPIRONOLACTONE 100 MG/1
TABLET, FILM COATED ORAL
Qty: 30 TABLET | Refills: 0 | Status: SHIPPED | OUTPATIENT
Start: 2022-10-06 | End: 2022-11-08

## 2022-10-06 NOTE — TELEPHONE ENCOUNTER
Medication is being filled for 1 time refill only due to:  Patient needs to be seen because it has been more than one year since last visit.   Due for physical.  Last 9/21/21.    Cassia Shah RN

## 2022-10-17 ENCOUNTER — HOSPITAL ENCOUNTER (OUTPATIENT)
Dept: MAMMOGRAPHY | Facility: CLINIC | Age: 62
Discharge: HOME | End: 2022-10-17
Attending: FAMILY MEDICINE | Admitting: FAMILY MEDICINE
Payer: COMMERCIAL

## 2022-10-17 DIAGNOSIS — Z12.31 BREAST CANCER SCREENING BY MAMMOGRAM: ICD-10-CM

## 2022-10-17 PROCEDURE — 77067 SCR MAMMO BI INCL CAD: CPT

## 2022-10-29 ENCOUNTER — HEALTH MAINTENANCE LETTER (OUTPATIENT)
Age: 62
End: 2022-10-29

## 2022-11-08 ENCOUNTER — OFFICE VISIT (OUTPATIENT)
Dept: FAMILY MEDICINE | Facility: CLINIC | Age: 62
End: 2022-11-08
Payer: COMMERCIAL

## 2022-11-08 VITALS
OXYGEN SATURATION: 99 % | DIASTOLIC BLOOD PRESSURE: 89 MMHG | RESPIRATION RATE: 16 BRPM | HEART RATE: 69 BPM | TEMPERATURE: 97.7 F | SYSTOLIC BLOOD PRESSURE: 155 MMHG | HEIGHT: 69 IN | WEIGHT: 150 LBS | BODY MASS INDEX: 22.22 KG/M2

## 2022-11-08 DIAGNOSIS — Z11.59 NEED FOR HEPATITIS C SCREENING TEST: ICD-10-CM

## 2022-11-08 DIAGNOSIS — N63.25 BREAST LUMP ON LEFT SIDE AT 3 O'CLOCK POSITION: ICD-10-CM

## 2022-11-08 DIAGNOSIS — Z00.00 ROUTINE GENERAL MEDICAL EXAMINATION AT A HEALTH CARE FACILITY: Primary | ICD-10-CM

## 2022-11-08 DIAGNOSIS — N95.1 MENOPAUSAL SYNDROME (HOT FLASHES): ICD-10-CM

## 2022-11-08 DIAGNOSIS — Z12.4 CERVICAL CANCER SCREENING: ICD-10-CM

## 2022-11-08 DIAGNOSIS — I10 ESSENTIAL HYPERTENSION, BENIGN: ICD-10-CM

## 2022-11-08 DIAGNOSIS — Z11.4 SCREENING FOR HIV (HUMAN IMMUNODEFICIENCY VIRUS): ICD-10-CM

## 2022-11-08 PROCEDURE — 90471 IMMUNIZATION ADMIN: CPT | Performed by: FAMILY MEDICINE

## 2022-11-08 PROCEDURE — 87624 HPV HI-RISK TYP POOLED RSLT: CPT | Performed by: FAMILY MEDICINE

## 2022-11-08 PROCEDURE — 90750 HZV VACC RECOMBINANT IM: CPT | Performed by: FAMILY MEDICINE

## 2022-11-08 PROCEDURE — 99214 OFFICE O/P EST MOD 30 MIN: CPT | Mod: 25 | Performed by: FAMILY MEDICINE

## 2022-11-08 PROCEDURE — G0145 SCR C/V CYTO,THINLAYER,RESCR: HCPCS | Performed by: FAMILY MEDICINE

## 2022-11-08 PROCEDURE — G0124 SCREEN C/V THIN LAYER BY MD: HCPCS | Performed by: PATHOLOGY

## 2022-11-08 PROCEDURE — 99396 PREV VISIT EST AGE 40-64: CPT | Mod: 25 | Performed by: FAMILY MEDICINE

## 2022-11-08 RX ORDER — METOPROLOL SUCCINATE 25 MG/1
25 TABLET, EXTENDED RELEASE ORAL DAILY
Qty: 30 TABLET | Refills: 3 | Status: SHIPPED | OUTPATIENT
Start: 2022-11-08 | End: 2023-02-27

## 2022-11-08 RX ORDER — SPIRONOLACTONE 100 MG/1
100 TABLET, FILM COATED ORAL DAILY
Qty: 30 TABLET | Refills: 0 | Status: SHIPPED | OUTPATIENT
Start: 2022-11-08 | End: 2022-12-15

## 2022-11-08 ASSESSMENT — PAIN SCALES - GENERAL: PAINLEVEL: NO PAIN (0)

## 2022-11-08 NOTE — PROGRESS NOTES
SUBJECTIVE:   CC: Aziza is an 62 year old who presents for preventive health visit.       Patient has been advised of split billing requirements and indicates understanding: Yes  Healthy Habits:     Taking medications regularly:  0    PHQ-2 Total Score: 0  History of Present Illness       Reason for visit:  Annual Exam    She eats 2-3 servings of fruits and vegetables daily.She consumes 0 sweetened beverage(s) daily.She exercises with enough effort to increase her heart rate 30 to 60 minutes per day.  She exercises with enough effort to increase her heart rate 6 days per week.   She is taking medications regularly.    Blood pressure high  Has been on metoprolol  Lisinopril caused coughing  Losartan was ok  Stopped on own    Wants patches for hot flashes.          PROBLEMS TO ADD ON...    Today's PHQ-2 Score:   PHQ-2 ( 1999 Pfizer) 11/8/2022   Q1: Little interest or pleasure in doing things 0   Q2: Feeling down, depressed or hopeless 0   PHQ-2 Score 0   PHQ-2 Total Score (12-17 Years)- Positive if 3 or more points; Administer PHQ-A if positive -   Q1: Little interest or pleasure in doing things Not at all   Q2: Feeling down, depressed or hopeless Not at all   PHQ-2 Score 0       Abuse: Current or Past (Physical, Sexual or Emotional) - No  Do you feel safe in your environment? Yes        Social History     Tobacco Use     Smoking status: Never     Smokeless tobacco: Never   Substance Use Topics     Alcohol use: Yes     Comment: 2 glasses 3 times a week     If you drink alcohol do you typically have >3 drinks per day or >7 drinks per week? No    No flowsheet data found.    Reviewed orders with patient.  Reviewed health maintenance and updated orders accordingly - Yes  Labs reviewed in EPIC    Breast Cancer Screening:    Breast CA Risk Assessment (FHS-7) 9/21/2021   Do you have a family history of breast, colon, or ovarian cancer? No / Unknown         Mammogram Screening: Recommended mammography every 1-2 years with  "patient discussion and risk factor consideration  Pertinent mammograms are reviewed under the imaging tab.    History of abnormal Pap smear: NO - age 30-65 PAP every 5 years with negative HPV co-testing recommended  PAP / HPV Latest Ref Rng & Units 9/21/2021   PAP   Negative for Intraepithelial Lesion or Malignancy (NILM)   HPV16 Negative Negative   HPV18 Negative Negative   HRHPV Negative Negative     Reviewed and updated as needed this visit by clinical staff   Tobacco  Allergies  Meds  Problems  Med Hx  Surg Hx  Fam Hx          Reviewed and updated as needed this visit by Provider   Tobacco  Allergies  Meds  Problems  Med Hx  Surg Hx  Fam Hx             Review of Systems  CONSTITUTIONAL: NEGATIVE for fever, chills, change in weight  INTEGUMENTARY/SKIN: NEGATIVE for worrisome rashes, moles or lesions  EYES: NEGATIVE for vision changes or irritation  ENT: NEGATIVE for ear, mouth and throat problems  RESP: NEGATIVE for significant cough or SOB  BREAST: NEGATIVE for masses, tenderness or discharge  CV: NEGATIVE for chest pain, palpitations or peripheral edema  GI: NEGATIVE for nausea, abdominal pain, heartburn, or change in bowel habits  : NEGATIVE for unusual urinary or vaginal symptoms. No vaginal bleeding.  MUSCULOSKELETAL: NEGATIVE for significant arthralgias or myalgia  NEURO: NEGATIVE for weakness, dizziness or paresthesias  PSYCHIATRIC: NEGATIVE for changes in mood or affect      OBJECTIVE:   BP (!) 155/89   Pulse 69   Temp 97.7  F (36.5  C) (Temporal)   Resp 16   Ht 1.753 m (5' 9\")   Wt 68 kg (150 lb)   LMP  (LMP Unknown)   SpO2 99%   BMI 22.15 kg/m    Physical Exam  GENERAL APPEARANCE: healthy, alert and no distress  EYES: Eyes grossly normal to inspection, PERRL and conjunctivae and sclerae normal  HENT: ear canals and TM's normal, nose and mouth without ulcers or lesions, oropharynx clear and oral mucous membranes moist  NECK: no adenopathy, no asymmetry, masses, or scars and " thyroid normal to palpation  RESP: lungs clear to auscultation - no rales, rhonchi or wheezes  BREAST:left breast easily palpable solid mass- at 3 oclock- none on the right breast-- that is  normal without masses, tenderness or nipple discharge and no palpable axillary masses or adenopathy  CV: regular rate and rhythm, normal S1 S2, no S3 or S4, no murmur, click or rub, no peripheral edema and peripheral pulses strong  ABDOMEN: soft, nontender, no hepatosplenomegaly, no masses and bowel sounds normal   (female): normal female external genitalia, normal urethral meatus, vaginal mucosal atrophy noted, normal cervix, adnexae, and uterus without masses or abnormal discharge  MS: no musculoskeletal defects are noted and gait is age appropriate without ataxia  SKIN: no suspicious lesions or rashes  NEURO: Normal strength and tone, sensory exam grossly normal, mentation intact and speech normal  PSYCH: mentation appears normal and affect normal/bright    Diagnostic Test Results:  Labs reviewed in Epic  No results found for this or any previous visit (from the past 24 hour(s)).    ASSESSMENT/PLAN:   Aziza was seen today for physical.    Diagnoses and all orders for this visit:    Routine general medical examination at a health care facility  Cervical cancer screening  -     Pap Screen with HPV - recommended age 30 - 65 years  If need- no need for 5 yrs    Screening for HIV (human immunodeficiency virus)  -     HIV Antigen Antibody Combo; Future    Need for hepatitis C screening test  -     Hepatitis C Screen Reflex to HCV RNA Quant and Genotype; Future        Breast lump on left side at 3 o'clock position  -she reports history of mass on left breast.         US Breast Left Complete 4 Quadrants; Future  -     MA Screen Left w/Azael; Future    Essential hypertension, benign-uncontrolled  Comments:   SPIRONOLACTONE 100 MG  Add metoprolol 25 mg once daily   TE appointment in 3-4 weeks to make usre Blood pressure is < 140/90 at  "target  Orders:  -     spironolactone (ALDACTONE) 100 MG tablet; Take 1 tablet (100 mg) by mouth daily  -     Basic metabolic panel  (Ca, Cl, CO2, Creat, Gluc, K, Na, BUN); Future  -     metoprolol succinate ER (TOPROL XL) 25 MG 24 hr tablet; Take 1 tablet (25 mg) by mouth daily    Menopausal syndrome (hot flashes)  -  DO NOT USE ANY HORMONES UNTIL IMAGING FOR BREAST IS CLEAR.  SIDE EFFECTS INCLUDING CANCER DISCUSSED IN DETAIL WITH PHYSICAL THERAPY.      She reports disabling hot flashes and wants HRT.     estradiol-norethindrone (COMBIPATCH) 0.05-0.14 MG/DAY bi-weekly patch; Place 1 patch onto the skin twice a week    Other orders  -     Cancel: INFLUENZA QUAD, RECOMBINANT, P-FREE (RIV4) (FLUBLOK) AGE 50-64 [WDI505]  -     REVIEW OF HEALTH MAINTENANCE PROTOCOL ORDERS  -     ZOSTER VACCINE RECOMBINANT ADJUVANTED (SHINGRIX)        Patient has been advised of split billing requirements and indicates understanding: Yes      COUNSELING:  Reviewed preventive health counseling, as reflected in patient instructions       Regular exercise       Healthy diet/nutrition       Vision screening       Hearing screening       Alcohol Use       Osteoporosis prevention/bone health    Estimated body mass index is 22.15 kg/m  as calculated from the following:    Height as of this encounter: 1.753 m (5' 9\").    Weight as of this encounter: 68 kg (150 lb).        She reports that she has never smoked. She has never used smokeless tobacco.      Counseling Resources:  ATP IV Guidelines  Pooled Cohorts Equation Calculator  Breast Cancer Risk Calculator  BRCA-Related Cancer Risk Assessment: FHS-7 Tool  FRAX Risk Assessment  ICSI Preventive Guidelines  Dietary Guidelines for Americans, 2010  USDA's MyPlate  ASA Prophylaxis  Lung CA Screening    Yen Torres MD  [facility]  "

## 2022-11-08 NOTE — PATIENT INSTRUCTIONS
Call to schedule your imaging:ultrasound breat/Left  [place] or [facility] [phone]  or  [facility] [phone]     HYPERTENSION  Add metoprolol 25 mg once daily  Continue spirolactone  Check BP target < 139/89

## 2022-11-09 PROBLEM — N63.25 BREAST LUMP ON LEFT SIDE AT 3 O'CLOCK POSITION: Status: ACTIVE | Noted: 2022-11-09

## 2022-11-14 LAB
BKR LAB AP GYN ADEQUACY: ABNORMAL
BKR LAB AP GYN INTERPRETATION: ABNORMAL
BKR LAB AP HPV REFLEX: ABNORMAL
BKR LAB AP PREVIOUS ABNORMAL: ABNORMAL
PATH REPORT.COMMENTS IMP SPEC: ABNORMAL
PATH REPORT.COMMENTS IMP SPEC: ABNORMAL
PATH REPORT.RELEVANT HX SPEC: ABNORMAL

## 2022-11-15 LAB
HUMAN PAPILLOMA VIRUS 16 DNA: NEGATIVE
HUMAN PAPILLOMA VIRUS 18 DNA: NEGATIVE
HUMAN PAPILLOMA VIRUS FINAL DIAGNOSIS: ABNORMAL
HUMAN PAPILLOMA VIRUS OTHER HR: POSITIVE

## 2022-11-16 ENCOUNTER — PATIENT OUTREACH (OUTPATIENT)
Dept: FAMILY MEDICINE | Facility: CLINIC | Age: 62
End: 2022-11-16

## 2022-11-17 ENCOUNTER — HOSPITAL ENCOUNTER (OUTPATIENT)
Dept: MAMMOGRAPHY | Facility: CLINIC | Age: 62
Discharge: HOME | End: 2022-11-17
Attending: FAMILY MEDICINE
Payer: COMMERCIAL

## 2022-11-17 DIAGNOSIS — N63.25 BREAST LUMP ON LEFT SIDE AT 3 O'CLOCK POSITION: ICD-10-CM

## 2022-11-17 PROCEDURE — 76642 ULTRASOUND BREAST LIMITED: CPT | Mod: LT

## 2023-01-15 DIAGNOSIS — I10 ESSENTIAL HYPERTENSION, BENIGN: ICD-10-CM

## 2023-01-17 RX ORDER — SPIRONOLACTONE 100 MG/1
TABLET, FILM COATED ORAL
Qty: 30 TABLET | Refills: 0 | Status: SHIPPED | OUTPATIENT
Start: 2023-01-17 | End: 2023-02-23

## 2023-01-17 NOTE — TELEPHONE ENCOUNTER
Routing refill request to provider for review/approval because:  Protocol not passed: labs  If follow-up desired, please advise  Kathy SHER RN

## 2023-02-19 DIAGNOSIS — I10 ESSENTIAL HYPERTENSION, BENIGN: ICD-10-CM

## 2023-02-20 NOTE — TELEPHONE ENCOUNTER
Per last prescription, A.S. would like to see for labs.  [employer] message sent to patient asking to schedule lab appointment.  Kathy SHER RN

## 2023-02-23 RX ORDER — SPIRONOLACTONE 100 MG/1
TABLET, FILM COATED ORAL
Qty: 30 TABLET | Refills: 0 | Status: SHIPPED | OUTPATIENT
Start: 2023-02-23 | End: 2023-09-06

## 2023-03-20 ENCOUNTER — TELEPHONE (OUTPATIENT)
Dept: FAMILY MEDICINE | Facility: CLINIC | Age: 63
End: 2023-03-20
Payer: COMMERCIAL

## 2023-03-20 NOTE — TELEPHONE ENCOUNTER
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     Pt states that she has had a low grade fever. Highest temp has been 100.5F. Pt is congested. She states she has a slight cough.    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 Please advise.    Asking if she needs to be seen, or can she be started on Antibiotics?    Delmis Tavares RN  [facility]

## 2023-03-20 NOTE — TELEPHONE ENCOUNTER
Left message for patient to call [facility] back  When patient calls back please transfer to an RN    Gelacio COOK RN   [facility]

## 2023-03-20 NOTE — TELEPHONE ENCOUNTER
Spoke to patient   Patient wanted to be seen today and not tomorrow   Offered an appointment time tomorrow but patient has work and wants antibiotics today   Patient will go to urgent care instead  Provided information about UC and wait time      Gelacio COOK RN   [facility]

## 2023-03-21 NOTE — TELEPHONE ENCOUNTER
DB-with me ok to be seen VV/OV  for chest congestion for tomorrow-unless seen at UC and has a plan f action

## 2023-03-22 ENCOUNTER — OFFICE VISIT (OUTPATIENT)
Dept: URGENT CARE | Facility: URGENT CARE | Age: 63
End: 2023-03-22
Payer: COMMERCIAL

## 2023-03-22 VITALS
DIASTOLIC BLOOD PRESSURE: 87 MMHG | WEIGHT: 151 LBS | SYSTOLIC BLOOD PRESSURE: 146 MMHG | TEMPERATURE: 98.6 F | OXYGEN SATURATION: 98 % | HEART RATE: 69 BPM | BODY MASS INDEX: 22.3 KG/M2

## 2023-03-22 DIAGNOSIS — J04.0 LARYNGITIS: Primary | ICD-10-CM

## 2023-03-22 LAB
BASOPHILS # BLD AUTO: 0 10E3/UL (ref 0–0.2)
BASOPHILS NFR BLD AUTO: 0 %
DEPRECATED S PYO AG THROAT QL EIA: NEGATIVE
EOSINOPHIL # BLD AUTO: 0.1 10E3/UL (ref 0–0.7)
EOSINOPHIL NFR BLD AUTO: 1 %
ERYTHROCYTE [DISTWIDTH] IN BLOOD BY AUTOMATED COUNT: 12.2 % (ref 10–15)
GROUP A STREP BY PCR: NOT DETECTED
HCT VFR BLD AUTO: 42 % (ref 35–47)
HGB BLD-MCNC: 13.9 G/DL (ref 11.7–15.7)
IMM GRANULOCYTES # BLD: 0 10E3/UL
IMM GRANULOCYTES NFR BLD: 0 %
LYMPHOCYTES # BLD AUTO: 1.9 10E3/UL (ref 0.8–5.3)
LYMPHOCYTES NFR BLD AUTO: 24 %
MCH RBC QN AUTO: 28.7 PG (ref 26.5–33)
MCHC RBC AUTO-ENTMCNC: 33.1 G/DL (ref 31.5–36.5)
MCV RBC AUTO: 87 FL (ref 78–100)
MONOCYTES # BLD AUTO: 0.6 10E3/UL (ref 0–1.3)
MONOCYTES NFR BLD AUTO: 8 %
MONOCYTES NFR BLD AUTO: NEGATIVE %
NEUTROPHILS # BLD AUTO: 5 10E3/UL (ref 1.6–8.3)
NEUTROPHILS NFR BLD AUTO: 66 %
PLATELET # BLD AUTO: 215 10E3/UL (ref 150–450)
RBC # BLD AUTO: 4.84 10E6/UL (ref 3.8–5.2)
WBC # BLD AUTO: 7.6 10E3/UL (ref 4–11)

## 2023-03-22 PROCEDURE — 87651 STREP A DNA AMP PROBE: CPT | Performed by: EMERGENCY MEDICINE

## 2023-03-22 PROCEDURE — 85025 COMPLETE CBC W/AUTO DIFF WBC: CPT | Performed by: EMERGENCY MEDICINE

## 2023-03-22 PROCEDURE — 86308 HETEROPHILE ANTIBODY SCREEN: CPT | Performed by: EMERGENCY MEDICINE

## 2023-03-22 PROCEDURE — 36415 COLL VENOUS BLD VENIPUNCTURE: CPT | Performed by: EMERGENCY MEDICINE

## 2023-03-22 PROCEDURE — 99214 OFFICE O/P EST MOD 30 MIN: CPT | Performed by: EMERGENCY MEDICINE

## 2023-03-22 RX ORDER — DEXAMETHASONE SODIUM PHOSPHATE 4 MG/ML
6 VIAL (ML) INJECTION ONCE
Status: DISCONTINUED | OUTPATIENT
Start: 2023-03-22 | End: 2023-03-22

## 2023-03-22 RX ORDER — DEXAMETHASONE SODIUM PHOSPHATE 10 MG/ML
6 INJECTION INTRAMUSCULAR; INTRAVENOUS ONCE
Status: COMPLETED | OUTPATIENT
Start: 2023-03-22 | End: 2023-03-22

## 2023-03-22 RX ADMIN — DEXAMETHASONE SODIUM PHOSPHATE 6 MG: 10 INJECTION INTRAMUSCULAR; INTRAVENOUS at 14:08

## 2023-03-22 NOTE — LETTER
[facility] [place]  [address]  [place] [address]  [phone]          March 22, 2023    RE:  Aziza Vegas                                                                                                                                                       [id number]  [place] [address]        To whom it may concern:    Aziza Vegas is under my professional care for Laryngitis She  may return to work with the following: The employee is UNABLE to return to work until 3/24/2023.     Sincerely,        Alex Tanner PA-C

## 2023-03-22 NOTE — PROGRESS NOTES
Assessment & Plan     Diagnosis:  (J04.0) Laryngitis  (primary encounter diagnosis)    Medical Decision Making:  Aziza Vegas is a 62 year old female presented with sore throat and clinical evidence of laryngitis.  The rapid strep test is negative, and formal culture has been set up in the lab. There is no clinical evidence of  peritonsillar abscess, retropharyngeal abscess, epiglottis, or Geoffrey's angina. The etiology is most likely viral.  Monospot is negative, CBC is unremarkable as noted below.  Patient declines further viral testing; notes that COVID testing at home has been negative. The patient's symptoms are consistent with viral laryngitis -- patient with no concerning symptoms for epiglottitis at this time including no odynophagia, dysphagia, difficulty breathing or concerns for impending airway compromise.  I have recommended treatment with analgesics, and we will await formal culture results.  Patient was provided with dose of Decadron here for symptomatic treatment.  If the culture is positive, a provider will call the patient to initiate anti-microbial therapy. Go to the ER if increasing pain, change in voice, neck pain, vomiting, fever, or shortness of breath. Follow-up with primary physician if not improving in 3-5 days. All questions answered. Patient verbalized understanding and agreement with the plan.    Alex Tanner PA-C  [facility]    Subjective     Aziza Vegas is a 62 year old female who presents to clinic today for the following health issues:  Chief Complaint   Patient presents with     URI     Coughing, loss of voice, sore throat x Sunday  - child from college  dx with mono, pneumonia   Tested negative for covid-19        HPI  Patient states that for the last 3 to 4 days she has been experiencing sore throat, postnasal drip, slight cough, and over the last 24 hours loss of voice.  She notes that her son is home from college and was diagnosed with pneumonia and  mono recently, he has been improving but she is concerned that she contracted mononucleosis.  She notes she is tested for COVID at home which has been negative.  She notes that is slightly bothersome to swallow, but she is having no difficulty swallowing solids or liquids.  She notes her voice is hoarse and seems to get worse that she is talking more.  She denies any difficulty breathing or swallowing, fevers, chest pain, shortness of breath, abdominal pain, nausea, vomiting, diarrhea, or other symptoms.      Review of Systems    See HPI    Objective      Vitals: BP (!) 146/87   Pulse 69   Temp 98.6  F (37  C) (Tympanic)   Wt 68.5 kg (151 lb)   LMP  (LMP Unknown)   SpO2 98%   BMI 22.30 kg/m    Resp: 16    Patient Vitals for the past 24 hrs:   BP Temp Temp src Pulse SpO2 Weight   03/22/23 1249 (!) 146/87 98.6  F (37  C) Tympanic 69 98 % 68.5 kg (151 lb)       Vital signs reviewed by: Alex Tanner PA-C    Physical Exam   Constitutional: Alert and active. Non-toxic appearing.  No acute distress.  HENT:   Right Ear: External ear normal. TM: gray/pale appearing  Left Ear: External ear normal. TM: gray/pale appearing.  Nose: Nose normal.    Eyes: Conjunctivae, EOM and lids are normal.   Mouth: Mucous membranes are moist. Posterior oropharynx is erythematous. Exudates not present. Tonsils are not enlarged. Uvula is midline. No submandibular swelling or erythema.  Neck: Normal ROM. No meningismus. Mild anterior cervical lymphadenopathy bilaterally. No posterior chain cervical lymphadenopathy noted.  Cardiovascular: Regular rate and rhythm  Pulmonary/Chest: Effort normal. No respiratory distress. Lungs are clear to auscultation throughout.\  GI: Abdomen is soft and non-tender throughout. No hepatosplenomegaly.  Musculoskeletal: Normal range of motion.   Neurological: Alert and appropriately oriented for age.   Skin: No rash noted on visualized skin.       Labs/Imaging:  Results for orders placed or performed in  visit on 03/22/23   Mononucleosis screen     Status: Normal   Result Value Ref Range    Mononucleosis Screen Negative Negative   CBC with platelets and differential     Status: None   Result Value Ref Range    WBC Count 7.6 4.0 - 11.0 10e3/uL    RBC Count 4.84 3.80 - 5.20 10e6/uL    Hemoglobin 13.9 11.7 - 15.7 g/dL    Hematocrit 42.0 35.0 - 47.0 %    MCV 87 78 - 100 fL    MCH 28.7 26.5 - 33.0 pg    MCHC 33.1 31.5 - 36.5 g/dL    RDW 12.2 10.0 - 15.0 %    Platelet Count 215 150 - 450 10e3/uL    % Neutrophils 66 %    % Lymphocytes 24 %    % Monocytes 8 %    % Eosinophils 1 %    % Basophils 0 %    % Immature Granulocytes 0 %    Absolute Neutrophils 5.0 1.6 - 8.3 10e3/uL    Absolute Lymphocytes 1.9 0.8 - 5.3 10e3/uL    Absolute Monocytes 0.6 0.0 - 1.3 10e3/uL    Absolute Eosinophils 0.1 0.0 - 0.7 10e3/uL    Absolute Basophils 0.0 0.0 - 0.2 10e3/uL    Absolute Immature Granulocytes 0.0 <=0.4 10e3/uL   Streptococcus A Rapid Screen w/Reflex to PCR - Clinic Collect     Status: Normal    Specimen: Throat; Swab   Result Value Ref Range    Group A Strep antigen Negative Negative   CBC with platelets and differential     Status: None    Narrative    The following orders were created for panel order CBC with platelets and differential.  Procedure                               Abnormality         Status                     ---------                               -----------         ------                     CBC with platelets and d...[[id number]]                      Final result                 Please view results for these tests on the individual orders.       Interventions:  Decadron 6mg PO      Alex Tanner PA-C, March 22, 2023

## 2023-03-22 NOTE — NURSING NOTE
Clinic Administered Medication Documentation        Patient was given Dexamethasone 0.6 ml oral . Prior to medication administration, verified patient's identity using patient s name and date of birth. Please see MAR and medication order for additional information. Patient instructed to remain in clinic for 15 minutes and report any adverse reaction to staff immediately.    Vial/Syringe: Multi dose vial         Nini F, CMA

## 2023-03-28 ENCOUNTER — OFFICE VISIT (OUTPATIENT)
Dept: URGENT CARE | Facility: URGENT CARE | Age: 63
End: 2023-03-28
Payer: COMMERCIAL

## 2023-03-28 VITALS
OXYGEN SATURATION: 97 % | SYSTOLIC BLOOD PRESSURE: 126 MMHG | BODY MASS INDEX: 22.45 KG/M2 | WEIGHT: 152 LBS | DIASTOLIC BLOOD PRESSURE: 75 MMHG | HEART RATE: 63 BPM | TEMPERATURE: 97.2 F

## 2023-03-28 DIAGNOSIS — J02.9 SORE THROAT: ICD-10-CM

## 2023-03-28 DIAGNOSIS — H69.91 DYSFUNCTION OF RIGHT EUSTACHIAN TUBE: ICD-10-CM

## 2023-03-28 DIAGNOSIS — H65.91 OME (OTITIS MEDIA WITH EFFUSION), RIGHT: Primary | ICD-10-CM

## 2023-03-28 LAB — MONOCYTES NFR BLD AUTO: NEGATIVE %

## 2023-03-28 PROCEDURE — 86308 HETEROPHILE ANTIBODY SCREEN: CPT

## 2023-03-28 PROCEDURE — 36415 COLL VENOUS BLD VENIPUNCTURE: CPT

## 2023-03-28 PROCEDURE — 99203 OFFICE O/P NEW LOW 30 MIN: CPT

## 2023-03-28 NOTE — PROGRESS NOTES
Chief Complaint   Patient presents with     Ear Problem     Seen 3/22 here for laryngitis - now developing discomfort in her right ear    Re-test for mono also from last  visit        ASSESSMENT/PLAN:  Aziza was seen today for ear problem.    Diagnoses and all orders for this visit:    OME (otitis media with effusion), right    Dysfunction of right eustachian tube    Sore throat  -     Mononucleosis screen; Future  -     Mononucleosis screen    Retesting for mono per patient request.  This was negative  Has an effusion behind the right ear.  Nonpurulent and feels otherwise well.  Likely secondary to dysfunction of the eustachian tube from previous viral infection.  Should resolve on its own over 1 to 2 weeks.  Things that could potentially help with symptoms include:  Otovent off [employer] or [employer]  Flonase 2 sprays each nostril 1-2 weeks  Ibuprofen 400-600 mg (2-3 of the 200 mg OTC tablets or 400-600 mg of the children's liquid) up to 4 times daily with food or milk  Tylenol 500-1000 mg every 8 hours as needed  Pseudofed     Hank Teresa PA-C      SUBJECTIVE:  Aziza is a 62 year old female who presents to urgent care to be retested for mono since she was exposed to it.  She was seen a week ago but at that point it was too early to test her for mono.  She had a sore throat at that time and was given Decadron which improved her symptoms.  However over the last 3 to 4 days her right ear has become plugged, painful and she has decreased hearing    ROS: Pertinent ROS neg other than the symptoms noted above in the HPI.     OBJECTIVE:  /75   Pulse 63   Temp 97.2  F (36.2  C) (Tympanic)   Wt 68.9 kg (152 lb)   LMP  (LMP Unknown)   SpO2 97%   BMI 22.45 kg/m     GENERAL: healthy, alert and no distress  EYES: Eyes grossly normal to inspection, PERRL and conjunctivae and sclerae normal  HENT: No significant oropharynx erythema, left tympanic Mem within normal limits, right tympanic membrane minimally bulging  with serous effusion and some mild erythema  NECK: no adenopathy, nontender    DIAGNOSTICS    Results for orders placed or performed in visit on 03/28/23   Mononucleosis screen     Status: Normal   Result Value Ref Range    Mononucleosis Screen Negative Negative        Current Outpatient Medications   Medication     CALCIUM & MAGNESIUM CARBONATES PO     estradiol-norethindrone (COMBIPATCH) 0.05-0.14 MG/DAY bi-weekly patch     metoprolol succinate ER (TOPROL XL) 25 MG 24 hr tablet     Multiple Vitamins-Minerals (MULTIVITAL PO)     spironolactone (ALDACTONE) 100 MG tablet     vitamin B complex with vitamin C (STRESS TAB) tablet     No current facility-administered medications for this visit.      Patient Active Problem List   Diagnosis     Essential hypertension, benign     Rash     Anxiety     Perioral dermatitis     Menopausal syndrome (hot flashes)     Encounter for screening for malignant neoplasm of breast     History of loop electrical excision procedure (LEEP)     Hot flashes     Breast lump on left side at 3 o'clock position      Past Medical History:   Diagnosis Date     Hypertension      PONV (postoperative nausea and vomiting)      Past Surgical History:   Procedure Laterality Date     CERCLAGE CERVICAL      x 2     COLONOSCOPY      internal hemorhoids     COLONOSCOPY N/A 3/13/2015    Procedure: COLONOSCOPY;  Surgeon: Schuyler Mercedes MD;  Location:  GI     HC HEMORRHOIDECTOMY W BANDING/LIGATION N/A 3/13/2015    Procedure: HEMORRHOIDECTOMY BANDING;  Surgeon: Schuyler Mercedes MD;  Location:  GI     HEMORRHOIDECTOMY       LEEP TX, CERVICAL       wisdom teeth extraction       Family History   Problem Relation Age of Onset     Hypertension Mother      Other Cancer Mother      Coronary Artery Disease Father      Hypertension Father      Prostate Cancer Father      Social History     Tobacco Use     Smoking status: Never     Smokeless tobacco: Never   Substance Use Topics     Alcohol use: Yes     Comment: 2  glasses 3 times a week              The plan of care was discussed with the patient. They understand and agree with the course of treatment prescribed. A printed summary was given including instructions and medications.  The use of Dragon/[employer] dictation services may have been used to construct the content in this note; any grammatical or spelling errors are non-intentional. Please contact the author of this note directly if you are in need of any clarification.

## 2023-03-28 NOTE — PATIENT INSTRUCTIONS
Otovent off [employer] or [facility]  Flonase 2 sprays each nostril 1-2 weeks  Ibuprofen 400-600 mg (2-3 of the 200 mg OTC tablets or 400-600 mg of the children's liquid) up to 4 times daily with food or milk  Tylenol 500-1000 mg every 8 hours as needed  Pseudofed

## 2023-07-15 DIAGNOSIS — I10 ESSENTIAL HYPERTENSION, BENIGN: ICD-10-CM

## 2023-07-17 RX ORDER — METOPROLOL SUCCINATE 25 MG/1
TABLET, EXTENDED RELEASE ORAL
Qty: 90 TABLET | Refills: 0 | Status: SHIPPED | OUTPATIENT
Start: 2023-07-17 | End: 2023-09-06

## 2023-09-06 ENCOUNTER — OFFICE VISIT (OUTPATIENT)
Dept: FAMILY MEDICINE | Facility: CLINIC | Age: 63
End: 2023-09-06
Payer: COMMERCIAL

## 2023-09-06 VITALS
HEART RATE: 61 BPM | OXYGEN SATURATION: 99 % | SYSTOLIC BLOOD PRESSURE: 155 MMHG | HEIGHT: 69 IN | TEMPERATURE: 97.3 F | RESPIRATION RATE: 16 BRPM | BODY MASS INDEX: 22.72 KG/M2 | WEIGHT: 153.4 LBS | DIASTOLIC BLOOD PRESSURE: 83 MMHG

## 2023-09-06 DIAGNOSIS — Z11.59 NEED FOR HEPATITIS C SCREENING TEST: ICD-10-CM

## 2023-09-06 DIAGNOSIS — Z11.4 SCREENING FOR HIV (HUMAN IMMUNODEFICIENCY VIRUS): ICD-10-CM

## 2023-09-06 DIAGNOSIS — N95.1 MENOPAUSAL SYNDROME (HOT FLASHES): ICD-10-CM

## 2023-09-06 DIAGNOSIS — N89.8 VAGINAL DISCHARGE: ICD-10-CM

## 2023-09-06 DIAGNOSIS — I10 ESSENTIAL HYPERTENSION, BENIGN: Primary | ICD-10-CM

## 2023-09-06 LAB
ANION GAP SERPL CALCULATED.3IONS-SCNC: 12 MMOL/L (ref 7–15)
BUN SERPL-MCNC: 12.7 MG/DL (ref 8–23)
CALCIUM SERPL-MCNC: 9.6 MG/DL (ref 8.8–10.2)
CHLORIDE SERPL-SCNC: 102 MMOL/L (ref 98–107)
CLUE CELLS: PRESENT
CREAT SERPL-MCNC: 0.64 MG/DL (ref 0.51–0.95)
DEPRECATED HCO3 PLAS-SCNC: 26 MMOL/L (ref 22–29)
EGFRCR SERPLBLD CKD-EPI 2021: >90 ML/MIN/1.73M2
GLUCOSE SERPL-MCNC: 83 MG/DL (ref 70–99)
POTASSIUM SERPL-SCNC: 4.2 MMOL/L (ref 3.4–5.3)
SODIUM SERPL-SCNC: 140 MMOL/L (ref 136–145)
TRICHOMONAS, WET PREP: ABNORMAL
WBC'S/HIGH POWER FIELD, WET PREP: ABNORMAL
YEAST, WET PREP: ABNORMAL

## 2023-09-06 PROCEDURE — 87591 N.GONORRHOEAE DNA AMP PROB: CPT | Performed by: FAMILY MEDICINE

## 2023-09-06 PROCEDURE — 87210 SMEAR WET MOUNT SALINE/INK: CPT | Performed by: FAMILY MEDICINE

## 2023-09-06 PROCEDURE — 80048 BASIC METABOLIC PNL TOTAL CA: CPT | Performed by: FAMILY MEDICINE

## 2023-09-06 PROCEDURE — 87491 CHLMYD TRACH DNA AMP PROBE: CPT | Performed by: FAMILY MEDICINE

## 2023-09-06 PROCEDURE — 36415 COLL VENOUS BLD VENIPUNCTURE: CPT | Performed by: FAMILY MEDICINE

## 2023-09-06 PROCEDURE — 87389 HIV-1 AG W/HIV-1&-2 AB AG IA: CPT | Performed by: FAMILY MEDICINE

## 2023-09-06 PROCEDURE — 86803 HEPATITIS C AB TEST: CPT | Performed by: FAMILY MEDICINE

## 2023-09-06 PROCEDURE — 99214 OFFICE O/P EST MOD 30 MIN: CPT | Performed by: FAMILY MEDICINE

## 2023-09-06 RX ORDER — METOPROLOL SUCCINATE 50 MG/1
50 TABLET, EXTENDED RELEASE ORAL DAILY
Qty: 90 TABLET | Refills: 3 | Status: SHIPPED | OUTPATIENT
Start: 2023-09-06 | End: 2024-04-23

## 2023-09-06 RX ORDER — METOPROLOL SUCCINATE 25 MG/1
25 TABLET, EXTENDED RELEASE ORAL DAILY
Qty: 90 TABLET | Refills: 3 | Status: SHIPPED | OUTPATIENT
Start: 2023-09-06 | End: 2023-09-06

## 2023-09-06 RX ORDER — METRONIDAZOLE 500 MG/1
500 TABLET ORAL 2 TIMES DAILY
Qty: 14 TABLET | Refills: 0 | Status: SHIPPED | OUTPATIENT
Start: 2023-09-06 | End: 2023-09-13

## 2023-09-06 ASSESSMENT — PAIN SCALES - GENERAL: PAINLEVEL: NO PAIN (0)

## 2023-09-06 NOTE — PROGRESS NOTES
Assessment & Plan   1. Essential hypertension, benign  Plan: uncontrolled  Advised to increase metoprolol from 25 to 50 mg once daily   Phone follow up in 4 weeks,  Target BP is < 139/89  - Basic metabolic panel  (Ca, Cl, CO2, Creat, Gluc, K, Na, BUN); Future  - metoprolol succinate ER (TOPROL XL) 50 MG 24 hr tablet; Take 1 tablet (50 mg) by mouth daily  Dispense: 90 tablet; Refill: 3    2. Vaginal discharge  - Wet prep - Clinic Collect- positive for bacterial vaginosis  Medication options discussed.  She found vaginal antibiotic messy last night- so would like to get oral antibiotic    - Neisseria gonorrhoeae PCR - Clinic Collect  - Chlamydia trachomatis PCR; Future  - Chlamydia trachomatis PCR  - metroNIDAZOLE (FLAGYL) 500 MG tablet; Take 1 tablet (500 mg) by mouth 2 times daily for 7 days  Dispense: 14 tablet; Refill: 0  Potential medication side effects were discussed with the patient; let me know if any occur.      3. Menopausal syndrome (hot flashes)  Plan: she never picked the prescription in Nov 2022, and would like to try it and I also concur to try it for shortest duration and see if sleep and bd hot flashes improve    - estradiol-norethindrone (COMBIPATCH) 0.05-0.14 MG/DAY bi-weekly patch; Place 1 patch onto the skin twice a week  Dispense: 8 patch; Refill: 3          Diagnosis or treatment significantly limited by social determinants of health - Plan:  Ordering of each unique test  I spent a total of 33 minutes on the day of the visit.   Time spent by me doing chart review, history and exam, documentation and further activities per the note           Yen Torres MD  [facility]    Dianna Ervin is a 62 year old, presenting for the following health issues:  No chief complaint on file.      History of Present Illness       Reason for visit:  Vaginal discharge  Symptom onset:  More than a month  Symptoms include:  Discharge    She eats 2-3 servings of fruits and vegetables  "daily.She consumes 0 sweetened beverage(s) daily.She exercises with enough effort to increase her heart rate 30 to 60 minutes per day.  She exercises with enough effort to increase her heart rate 7 days per week.   She is taking medications regularly.       Vaginal Symptoms  Onset/Duration: More than month   Description:  Vaginal Discharge: Yellow Discharge    Itching (Pruritis): YES  Burning sensation:  YES  Odor: No  Accompanying Signs & Symptoms:  Urinary symptoms: No  Abdominal pain: No  Fever: No  History:   Sexually active: YES  New Partner: No  Possibility of Pregnancy:  No  Recent antibiotic use: No  Previous vaginitis issues: YES  Precipitating or alleviating factors: None  Therapies tried and outcome: none      Hypertension follow up  Checks Blood pressure at home -130's /80  stopped spirnolactone as home BP normal and she is on metoprolo     Never started the patches for ERT and wondering if too late now        Review of Systems   Constitutional, HEENT, cardiovascular, pulmonary, GI, , musculoskeletal, neuro, skin, endocrine and psych systems are negative, except as otherwise noted.      Objective    BP (!) 155/83   Pulse 61   Temp 97.3  F (36.3  C) (Temporal)   Resp 16   Ht 1.75 m (5' 8.9\")   Wt 69.6 kg (153 lb 6.4 oz)   LMP  (LMP Unknown)   SpO2 99%   Breastfeeding No   BMI 22.72 kg/m    Body mass index is 22.72 kg/m .  Physical Exam   GENERAL: healthy, alert and no distress  NECK: no adenopathy, no asymmetry, masses, or scars and thyroid normal to palpation  RESP: lungs clear to auscultation - no rales, rhonchi or wheezes  CV: regular rate and rhythm, normal S1 S2, no S3 or S4, no murmur, click or rub, no peripheral edema and peripheral pulses strong  ABDOMEN: soft, nontender, no hepatosplenomegaly, no masses and bowel sounds normal  MS: no gross musculoskeletal defects noted, no edema    Results for orders placed or performed in visit on 09/06/23 (from the past 24 hour(s))   Wet prep - " Clinic Collect    Specimen: Vagina; Swab   Result Value Ref Range    Trichomonas Absent Absent    Yeast Absent Absent    Clue Cells Present (A) Absent    WBCs/high power field 2+ (A) None

## 2023-09-07 LAB
C TRACH DNA SPEC QL NAA+PROBE: NEGATIVE
HCV AB SERPL QL IA: NONREACTIVE
HIV 1+2 AB+HIV1 P24 AG SERPL QL IA: NONREACTIVE
N GONORRHOEA DNA SPEC QL NAA+PROBE: NEGATIVE

## 2023-10-09 ENCOUNTER — PATIENT OUTREACH (OUTPATIENT)
Dept: CARE COORDINATION | Facility: CLINIC | Age: 63
End: 2023-10-09
Payer: COMMERCIAL

## 2023-10-19 ENCOUNTER — HOSPITAL ENCOUNTER (OUTPATIENT)
Dept: MAMMOGRAPHY | Facility: CLINIC | Age: 63
Discharge: HOME | End: 2023-10-19
Attending: FAMILY MEDICINE | Admitting: FAMILY MEDICINE
Payer: COMMERCIAL

## 2023-10-19 DIAGNOSIS — N63.25 BREAST LUMP ON LEFT SIDE AT 3 O'CLOCK POSITION: ICD-10-CM

## 2023-10-19 PROCEDURE — 77067 SCR MAMMO BI INCL CAD: CPT

## 2023-10-23 ENCOUNTER — PATIENT OUTREACH (OUTPATIENT)
Dept: FAMILY MEDICINE | Facility: CLINIC | Age: 63
End: 2023-10-23
Payer: COMMERCIAL

## 2023-10-23 ENCOUNTER — PATIENT OUTREACH (OUTPATIENT)
Dept: CARE COORDINATION | Facility: CLINIC | Age: 63
End: 2023-10-23
Payer: COMMERCIAL

## 2023-10-23 DIAGNOSIS — Z98.890 HISTORY OF LOOP ELECTRICAL EXCISION PROCEDURE (LEEP): Primary | ICD-10-CM

## 2023-10-24 ENCOUNTER — TELEPHONE (OUTPATIENT)
Dept: FAMILY MEDICINE | Facility: CLINIC | Age: 63
End: 2023-10-24
Payer: COMMERCIAL

## 2023-10-24 NOTE — TELEPHONE ENCOUNTER
Patient calling again for this request, awaiting call back with imaging interpretation from PCP.     Rhiannon Bob RN

## 2023-10-24 NOTE — TELEPHONE ENCOUNTER
A.S.        Patient called.  Re: recent mammogram results.    States she was informed she need to return today for more testing due to lump in her breast.    States she was not given anymore information and would like to take to you about this.    Thank you,  Cassia Shah RN

## 2023-10-25 ENCOUNTER — HOSPITAL ENCOUNTER (OUTPATIENT)
Dept: MAMMOGRAPHY | Facility: CLINIC | Age: 63
Discharge: HOME | End: 2023-10-25
Attending: FAMILY MEDICINE
Payer: COMMERCIAL

## 2023-10-25 DIAGNOSIS — R92.8 ABNORMAL MAMMOGRAM: ICD-10-CM

## 2023-10-25 PROCEDURE — 76642 ULTRASOUND BREAST LIMITED: CPT | Mod: LT

## 2023-10-25 PROCEDURE — 77061 BREAST TOMOSYNTHESIS UNI: CPT | Mod: LT

## 2023-10-25 NOTE — TELEPHONE ENCOUNTER
Spoke with patient  Combipatch-resolved hot flashes.  I advised to stop the ERT- till follow up imaging and if need for aspiration or biopsy is clear.  History of multiple cyst aspiration on rt breast in [facility]-2015  Mom had cyst aspiration.        BP Readings from Last 3 Encounters:   09/06/23 (!) 155/83   03/28/23 126/75   03/22/23 (!) 146/87

## 2023-10-27 ENCOUNTER — HOSPITAL ENCOUNTER (OUTPATIENT)
Dept: MAMMOGRAPHY | Facility: CLINIC | Age: 63
Discharge: HOME | End: 2023-10-27
Attending: FAMILY MEDICINE
Payer: COMMERCIAL

## 2023-10-27 DIAGNOSIS — R92.8 ABNORMAL MAMMOGRAM: ICD-10-CM

## 2023-10-27 PROCEDURE — 250N000009 HC RX 250: Performed by: RADIOLOGY

## 2023-10-27 PROCEDURE — 999N000065 MA POST PROCEDURE LEFT

## 2023-10-27 PROCEDURE — A4648 IMPLANTABLE TISSUE MARKER: HCPCS

## 2023-10-27 PROCEDURE — 88305 TISSUE EXAM BY PATHOLOGIST: CPT | Mod: TC | Performed by: FAMILY MEDICINE

## 2023-10-27 RX ADMIN — LIDOCAINE HYDROCHLORIDE 5 ML: 10 INJECTION, SOLUTION INFILTRATION; PERINEURAL at 08:21

## 2023-10-30 LAB
PATH REPORT.COMMENTS IMP SPEC: NORMAL
PATH REPORT.FINAL DX SPEC: NORMAL
PATH REPORT.GROSS SPEC: NORMAL
PATH REPORT.MICROSCOPIC SPEC OTHER STN: NORMAL
PATH REPORT.RELEVANT HX SPEC: NORMAL
PHOTO IMAGE: NORMAL

## 2023-10-30 PROCEDURE — 88305 TISSUE EXAM BY PATHOLOGIST: CPT | Mod: 26 | Performed by: PATHOLOGY

## 2023-10-31 ENCOUNTER — TELEPHONE (OUTPATIENT)
Dept: MAMMOGRAPHY | Facility: CLINIC | Age: 63
End: 2023-10-31
Payer: COMMERCIAL

## 2023-10-31 NOTE — TELEPHONE ENCOUNTER
After review by Breast Center Radiologist, Dr. Patricio RomoAziza was called,  verified, and given her 10/27/2023 LEFT Breast Biopsy results (Benign tissue, cyst wall) and recommended Follow up (Annual Screening Mammograms).   Patient denies any concerns with the biopsy site. Ordering provider was informed of the results and follow up plan.  I encouraged her to contact her doctor with any further breast concerns.  Patient verbalized understanding and agrees with the plan of care.        Tiera Cedeño RN BSN  Procedure Nurse  [facility]  [phone]    ----------------------------------------------------------    [facility]  Aziza Vegas [id number]  F, 1960  Surgical Pathology Report (Final result) [id number]  Authorizing Provider: Yen Torres MD Ordering Provider: Yen Torres MD  Ordering Location: [facility]  Collected: 10/27/2023 08:32 AM  Pathologist: Dontrell Miranda MD Received: 10/27/2023 09:22 AM  .  Specimens  A Breast, Left  .  .  Final Diagnosis  A. Left breast upper outer quadrant needle biopsy:  - Negative for malignancy, benign breast tissue with cyst wall without epithelial lining showing reactive foamy  histiocytes and plasma cells.  Electronically signed by Dontrell Miranda MD on 10/30/2023 at 4:49 PM

## 2023-12-28 NOTE — TELEPHONE ENCOUNTER
FYI to provider - Patient is lost to pap tracking follow-up. Attempts to contact pt have been made per reminder process and there has been no reply and/or no appt scheduled. Contact hx listed below.     Hx of LEEP, 27 yrs old per pt.    12/19/17 NIL Pap, Neg HR HPV- recheck ok in 12/22 09/21/21 NIL Pap, Neg HR HPV Plan cotest in 1 year due 09/21/22.   11/8/22 LSIL Pap, + HR HPV (neg 16/18). Cotest in 1 year.   11/16/2022 Pt notified by phone. Pt will send records from previous OB/GYN provider.   10/23/23 Reminder MyChart  11/27/23 Reminder call - spoke with patient  12/28/23 Lost to follow-up for pap tracking

## 2024-01-21 ENCOUNTER — HEALTH MAINTENANCE LETTER (OUTPATIENT)
Age: 64
End: 2024-01-21

## 2024-01-26 ENCOUNTER — OFFICE VISIT (OUTPATIENT)
Dept: URGENT CARE | Facility: URGENT CARE | Age: 64
End: 2024-01-26
Payer: COMMERCIAL

## 2024-01-26 VITALS
SYSTOLIC BLOOD PRESSURE: 158 MMHG | BODY MASS INDEX: 23.13 KG/M2 | HEART RATE: 61 BPM | RESPIRATION RATE: 20 BRPM | DIASTOLIC BLOOD PRESSURE: 91 MMHG | WEIGHT: 156.2 LBS | OXYGEN SATURATION: 96 % | TEMPERATURE: 97.8 F

## 2024-01-26 DIAGNOSIS — J22 LOWER RESPIRATORY INFECTION: Primary | ICD-10-CM

## 2024-01-26 PROCEDURE — 99213 OFFICE O/P EST LOW 20 MIN: CPT | Performed by: PHYSICIAN ASSISTANT

## 2024-01-26 RX ORDER — BENZONATATE 100 MG/1
100 CAPSULE ORAL 3 TIMES DAILY PRN
Qty: 30 CAPSULE | Refills: 0 | Status: SHIPPED | OUTPATIENT
Start: 2024-01-26 | End: 2024-02-05

## 2024-01-26 RX ORDER — AZITHROMYCIN 250 MG/1
TABLET, FILM COATED ORAL
Qty: 6 TABLET | Refills: 0 | Status: SHIPPED | OUTPATIENT
Start: 2024-01-26

## 2024-01-26 ASSESSMENT — ENCOUNTER SYMPTOMS
MYALGIAS: 0
WEAKNESS: 0
LIGHT-HEADEDNESS: 0
MUSCULOSKELETAL NEGATIVE: 1
CARDIOVASCULAR NEGATIVE: 1
EYES NEGATIVE: 1
RHINORRHEA: 0
NAUSEA: 0
NECK PAIN: 0
JOINT SWELLING: 0
CHILLS: 0
ARTHRALGIAS: 0
BACK PAIN: 0
ALLERGIC/IMMUNOLOGIC NEGATIVE: 1
FEVER: 0
SORE THROAT: 0
VOMITING: 0
NECK STIFFNESS: 0
PALPITATIONS: 0
ENDOCRINE NEGATIVE: 1
COUGH: 1
DIZZINESS: 0
WOUND: 0
DIARRHEA: 0
SHORTNESS OF BREATH: 0
HEADACHES: 0

## 2024-01-26 NOTE — PROGRESS NOTES
Chief Complaint:     No chief complaint on file.      No results found for any visits on 01/26/24.    Medical Decision Making:    Vital signs reviewed by Dontrell Manzano PA-C  LMP  (LMP Unknown)     Differential Diagnosis:  { Differential Choices:362928}        ASSESSMENT    No diagnosis found.    PLAN    Patient is in no acute distress.    Temp is *** in clinic today, lung sounds were clear, and O2 sats at ***% on RA.    RST was negative.  We will call with PCR results only if positive.  COVID swab collected in clinic today.  Rest, Push fluids, vaporizer, elevation of head of bed.  Ibuprofen and or Tylenol for any fever or body aches.  Over the counter cough suppressant- PRN- as discussed.   If symptoms worsen, recheck immediately otherwise follow up with your PCP in 1 week if symptoms are not improving.  Worrisome symptoms discussed with instructions to go to the ED.  Patient verbalized understanding and agreed with this plan.    Labs:    No results found for any visits on 01/26/24.     Vital signs reviewed by Dontrell Manzano PA-C  LMP  (LMP Unknown)     Current Meds      Current Outpatient Medications:     estradiol-norethindrone (COMBIPATCH) 0.05-0.14 MG/DAY bi-weekly patch, Place 1 patch onto the skin twice a week, Disp: 8 patch, Rfl: 3    metoprolol succinate ER (TOPROL XL) 50 MG 24 hr tablet, Take 1 tablet (50 mg) by mouth daily, Disp: 90 tablet, Rfl: 3    Multiple Vitamins-Minerals (MULTIVITAL PO), Take 1 tablet by mouth daily, Disp: , Rfl:     vitamin B complex with vitamin C (STRESS TAB) tablet, Take 1 tablet by mouth daily, Disp: , Rfl:       Respiratory History    {RESPIRATORY HISTORY:412}      SUBJECTIVE    HPI: Aziza Vegas is an 63 year old female who presents with {uri complaints:745009}.  Symptoms began {NUMBERS(MULTIPLE):320446}  {MONTH/WEEKS:803659} ago and has {CLINICAL COURSE - HISTORY:17}.  There is no {COUGH:521206}.  Patient is eating and drinking well.  No fever, nausea, vomiting, or  diarrhea.    Patient denies any recent travel or exposure to known COVID positive tested individual.      ROS:     Review of Systems   Constitutional:  Negative for chills and fever.   HENT:  Positive for congestion. Negative for ear pain, rhinorrhea and sore throat.    Eyes: Negative.    Respiratory:  Positive for cough. Negative for shortness of breath.    Cardiovascular: Negative.  Negative for chest pain and palpitations.   Gastrointestinal:  Negative for diarrhea, nausea and vomiting.   Endocrine: Negative.    Genitourinary: Negative.    Musculoskeletal: Negative.  Negative for arthralgias, back pain, joint swelling, myalgias, neck pain and neck stiffness.   Skin: Negative.  Negative for rash and wound.   Allergic/Immunologic: Negative.  Negative for immunocompromised state.   Neurological:  Negative for dizziness, weakness, light-headedness and headaches.         Family History   Family History   Problem Relation Age of Onset    Hypertension Mother     Other Cancer Mother     Coronary Artery Disease Father     Hypertension Father     Prostate Cancer Father         Problem history  Patient Active Problem List   Diagnosis    Essential hypertension, benign    Rash    Anxiety    Perioral dermatitis    Menopausal syndrome (hot flashes)    Encounter for screening for malignant neoplasm of breast    History of loop electrical excision procedure (LEEP)    Hot flashes    Breast lump on left side at 3 o'clock position    Vaginal discharge        Allergies  Allergies   Allergen Reactions    Lisinopril Cough        Social History  Social History     Socioeconomic History    Marital status:      Spouse name: Not on file    Number of children: Not on file    Years of education: Not on file    Highest education level: Not on file   Occupational History    Not on file   Tobacco Use    Smoking status: Never    Smokeless tobacco: Never   Substance and Sexual Activity    Alcohol use: Yes     Comment: 2 glasses 3 times a  week    Drug use: Never    Sexual activity: Yes     Partners: Male   Other Topics Concern    Parent/sibling w/ CABG, MI or angioplasty before 65F 55M? Not Asked   Social History Narrative    Not on file     Social Determinants of Health     Financial Resource Strain: Not on file   Food Insecurity: Not on file   Transportation Needs: Not on file   Physical Activity: Not on file   Stress: Not on file   Social Connections: Not on file   Interpersonal Safety: Not on file   Housing Stability: Not on file        OBJECTIVE     Vital signs reviewed by Dontrell Manzano PA-C  LMP  (LMP Unknown)      Physical Exam  Vitals and nursing note reviewed.   Constitutional:       General: She is not in acute distress.     Appearance: She is well-developed. She is not ill-appearing, toxic-appearing or diaphoretic.   HENT:      Head: Normocephalic and atraumatic.      Right Ear: Hearing, tympanic membrane, ear canal and external ear normal. Tympanic membrane is not perforated, erythematous, retracted or bulging.      Left Ear: Hearing, tympanic membrane, ear canal and external ear normal. Tympanic membrane is not perforated, erythematous, retracted or bulging.      Nose: Congestion present. No mucosal edema or rhinorrhea.      Right Sinus: No maxillary sinus tenderness or frontal sinus tenderness.      Left Sinus: No maxillary sinus tenderness or frontal sinus tenderness.      Mouth/Throat:      Pharynx: Posterior oropharyngeal erythema present. No pharyngeal swelling, oropharyngeal exudate or uvula swelling.      Tonsils: No tonsillar exudate or tonsillar abscesses. 0 on the right. 0 on the left.   Eyes:      General:         Right eye: No discharge.         Left eye: No discharge.      Pupils: Pupils are equal, round, and reactive to light.   Cardiovascular:      Rate and Rhythm: Normal rate and regular rhythm.      Heart sounds: Normal heart sounds. No murmur heard.     No friction rub. No gallop.   Pulmonary:      Effort: Pulmonary  effort is normal. No respiratory distress.      Breath sounds: Normal breath sounds. No decreased breath sounds, wheezing, rhonchi or rales.   Chest:      Chest wall: No tenderness.   Abdominal:      General: Bowel sounds are normal. There is no distension.      Palpations: Abdomen is soft. There is no mass.      Tenderness: There is no abdominal tenderness. There is no guarding.   Musculoskeletal:      Cervical back: Normal range of motion and neck supple.   Lymphadenopathy:      Head:      Right side of head: No submental, submandibular, tonsillar, preauricular or posterior auricular adenopathy.      Left side of head: No submental, submandibular, tonsillar, preauricular or posterior auricular adenopathy.      Cervical: No cervical adenopathy.      Right cervical: No superficial or posterior cervical adenopathy.     Left cervical: No superficial or posterior cervical adenopathy.   Skin:     General: Skin is warm and dry.      Findings: No rash.   Neurological:      Mental Status: She is alert and oriented to person, place, and time.      Cranial Nerves: No cranial nerve deficit.      Deep Tendon Reflexes: Reflexes are normal and symmetric.   Psychiatric:         Behavior: Behavior normal. Behavior is cooperative.         Thought Content: Thought content normal.         Judgment: Judgment normal.           Dontrell Manzano PA-C  1/26/2024, 1:21 PM

## 2024-01-26 NOTE — PROGRESS NOTES
Chief Complaint   Patient presents with    Cough     Would like to rule out RSV and Pneumonia; chest hurts when breathing    Fever                   ASSESSMENT:     ICD-10-CM    1. Lower respiratory infection  J22 azithromycin (ZITHROMAX Z-SILVERIO) 250 MG tablet     benzonatate (TESSALON) 100 MG capsule            PLAN: Likely viral respiratory infection with secondary bacterial infection.  Do not recommend RSV testing as it will not change my treatment plan.  Z-Silverio antibiotic.  Tessalon Perles.  I have discussed clinical findings with patient.  Side effects of medications discussed.  Symptomatic care is discussed.  I have discussed the possibility of  worsening symptoms and indication to RTC or go to the ER if they occur.  All questions are answered, patient indicates understanding of these issues and is in agreement with plan.   Patient care instructions are discussed/given at the end of visit.   Lots of rest and fluids.  Blood pressure elevated, likely secondary to illness.  Recheck outside of clinic once feeling better and follow-up with primary if any concerns.  Also some mild left lower sternal tenderness to palpation.  Likely some costochondritis, OTC ibuprofen as needed.  Latoya Santacruz PA-C      SUBJECTIVE:  63-year-old female presents for respiratory illness for 5 days.  Started with acute onset of high temp, body aches, cough.  Cough bothers her the most at this time.  Fever broke about 2 days ago.  Had influenza and COVID tests which were negative.  She works for [employer].  History of pneumonia in the past.  No history of asthma.  Worried it could be RSV      Allergies   Allergen Reactions    Lisinopril Cough       Past Medical History:   Diagnosis Date    Hypertension     PONV (postoperative nausea and vomiting)        metoprolol succinate ER (TOPROL XL) 50 MG 24 hr tablet, Take 1 tablet (50 mg) by mouth daily  Multiple Vitamins-Minerals (MULTIVITAL PO), Take 1 tablet by  mouth daily  vitamin B complex with vitamin C (STRESS TAB) tablet, Take 1 tablet by mouth daily  estradiol-norethindrone (COMBIPATCH) 0.05-0.14 MG/DAY bi-weekly patch, Place 1 patch onto the skin twice a week (Patient not taking: Reported on 1/26/2024)    No current facility-administered medications on file prior to visit.      Social History     Tobacco Use    Smoking status: Never    Smokeless tobacco: Never   Substance Use Topics    Alcohol use: Yes     Comment: 2 glasses 3 times a week       ROS:  CONSTITUTIONAL: Negative for fatigue or fever.  EYES: Negative for eye problems.  ENT: As above.  RESP: As above.  CV: Negative for chest pains.  GI: Negative for vomiting.  MUSCULOSKELETAL:  Negative for significant muscle or joint pains.  NEUROLOGIC: Negative for headaches.  SKIN: Negative for rash.  PSYCH: Normal mentation for age.    OBJECTIVE:  BP (!) 158/91   Pulse 61   Temp 97.8  F (36.6  C) (Tympanic)   Resp 20   Wt 70.9 kg (156 lb 3.2 oz)   LMP  (LMP Unknown)   SpO2 96%   BMI 23.13 kg/m    GENERAL APPEARANCE: Healthy, alert and no distress.  EYES:Conjunctiva/sclera clear.  THROAT: No erythema w/o tonsillar enlargement . No exudates.  NECK: Supple, nontender, no lymphadenopathy.  RESP: Lungs clear to auscultation - no rales, rhonchi or wheezes.  Mildly decreased breath sounds both bases.  CV: Regular rate and rhythm, normal S1 S2, no murmur noted.  NEURO: Awake, alert    SKIN: No rashes      Latoya Santacruz PA-C

## 2024-04-23 ENCOUNTER — OFFICE VISIT (OUTPATIENT)
Dept: FAMILY MEDICINE | Facility: CLINIC | Age: 64
End: 2024-04-23
Payer: COMMERCIAL

## 2024-04-23 VITALS
WEIGHT: 150 LBS | RESPIRATION RATE: 19 BRPM | SYSTOLIC BLOOD PRESSURE: 150 MMHG | OXYGEN SATURATION: 98 % | TEMPERATURE: 97.2 F | HEIGHT: 68 IN | DIASTOLIC BLOOD PRESSURE: 70 MMHG | HEART RATE: 62 BPM | BODY MASS INDEX: 22.73 KG/M2

## 2024-04-23 DIAGNOSIS — I10 UNCONTROLLED HYPERTENSION: ICD-10-CM

## 2024-04-23 DIAGNOSIS — N95.1 MENOPAUSAL SYNDROME (HOT FLASHES): ICD-10-CM

## 2024-04-23 DIAGNOSIS — Z00.00 ROUTINE GENERAL MEDICAL EXAMINATION AT A HEALTH CARE FACILITY: Primary | ICD-10-CM

## 2024-04-23 DIAGNOSIS — Z12.4 CERVICAL CANCER SCREENING: ICD-10-CM

## 2024-04-23 DIAGNOSIS — F41.9 MILD ANXIETY: ICD-10-CM

## 2024-04-23 DIAGNOSIS — D22.9 NUMEROUS MOLES: ICD-10-CM

## 2024-04-23 LAB
CREAT UR-MCNC: 91.7 MG/DL
MICROALBUMIN UR-MCNC: <12 MG/L
MICROALBUMIN/CREAT UR: NORMAL MG/G{CREAT}

## 2024-04-23 PROCEDURE — 96127 BRIEF EMOTIONAL/BEHAV ASSMT: CPT | Performed by: FAMILY MEDICINE

## 2024-04-23 PROCEDURE — 82570 ASSAY OF URINE CREATININE: CPT | Performed by: FAMILY MEDICINE

## 2024-04-23 PROCEDURE — 87624 HPV HI-RISK TYP POOLED RSLT: CPT | Performed by: FAMILY MEDICINE

## 2024-04-23 PROCEDURE — 99213 OFFICE O/P EST LOW 20 MIN: CPT | Mod: 25 | Performed by: FAMILY MEDICINE

## 2024-04-23 PROCEDURE — 80053 COMPREHEN METABOLIC PANEL: CPT | Performed by: FAMILY MEDICINE

## 2024-04-23 PROCEDURE — 99396 PREV VISIT EST AGE 40-64: CPT | Performed by: FAMILY MEDICINE

## 2024-04-23 PROCEDURE — 36415 COLL VENOUS BLD VENIPUNCTURE: CPT | Performed by: FAMILY MEDICINE

## 2024-04-23 PROCEDURE — G0145 SCR C/V CYTO,THINLAYER,RESCR: HCPCS | Performed by: FAMILY MEDICINE

## 2024-04-23 PROCEDURE — 82043 UR ALBUMIN QUANTITATIVE: CPT | Performed by: FAMILY MEDICINE

## 2024-04-23 RX ORDER — CLONIDINE HYDROCHLORIDE 0.1 MG/1
0.1 TABLET ORAL AT BEDTIME
Qty: 30 TABLET | Refills: 11 | Status: SHIPPED | OUTPATIENT
Start: 2024-04-23

## 2024-04-23 RX ORDER — METOPROLOL SUCCINATE 100 MG/1
100 TABLET, EXTENDED RELEASE ORAL DAILY
Qty: 90 TABLET | Refills: 3 | Status: SHIPPED | OUTPATIENT
Start: 2024-04-23

## 2024-04-23 RX ORDER — METOPROLOL SUCCINATE 100 MG/1
100 TABLET, EXTENDED RELEASE ORAL DAILY
Qty: 30 TABLET | Refills: 3 | Status: SHIPPED | OUTPATIENT
Start: 2024-04-23 | End: 2024-04-23

## 2024-04-23 SDOH — HEALTH STABILITY: PHYSICAL HEALTH: ON AVERAGE, HOW MANY DAYS PER WEEK DO YOU ENGAGE IN MODERATE TO STRENUOUS EXERCISE (LIKE A BRISK WALK)?: 7 DAYS

## 2024-04-23 ASSESSMENT — ANXIETY QUESTIONNAIRES
7. FEELING AFRAID AS IF SOMETHING AWFUL MIGHT HAPPEN: NOT AT ALL
6. BECOMING EASILY ANNOYED OR IRRITABLE: NOT AT ALL
3. WORRYING TOO MUCH ABOUT DIFFERENT THINGS: NOT AT ALL
2. NOT BEING ABLE TO STOP OR CONTROL WORRYING: SEVERAL DAYS
GAD7 TOTAL SCORE: 3
8. IF YOU CHECKED OFF ANY PROBLEMS, HOW DIFFICULT HAVE THESE MADE IT FOR YOU TO DO YOUR WORK, TAKE CARE OF THINGS AT HOME, OR GET ALONG WITH OTHER PEOPLE?: NOT DIFFICULT AT ALL
7. FEELING AFRAID AS IF SOMETHING AWFUL MIGHT HAPPEN: NOT AT ALL
1. FEELING NERVOUS, ANXIOUS, OR ON EDGE: NOT AT ALL
IF YOU CHECKED OFF ANY PROBLEMS ON THIS QUESTIONNAIRE, HOW DIFFICULT HAVE THESE PROBLEMS MADE IT FOR YOU TO DO YOUR WORK, TAKE CARE OF THINGS AT HOME, OR GET ALONG WITH OTHER PEOPLE: NOT DIFFICULT AT ALL
4. TROUBLE RELAXING: SEVERAL DAYS
GAD7 TOTAL SCORE: 3
GAD7 TOTAL SCORE: 3
5. BEING SO RESTLESS THAT IT IS HARD TO SIT STILL: SEVERAL DAYS

## 2024-04-23 ASSESSMENT — SOCIAL DETERMINANTS OF HEALTH (SDOH): HOW OFTEN DO YOU GET TOGETHER WITH FRIENDS OR RELATIVES?: TWICE A WEEK

## 2024-04-23 ASSESSMENT — PATIENT HEALTH QUESTIONNAIRE - PHQ9
SUM OF ALL RESPONSES TO PHQ QUESTIONS 1-9: 1
SUM OF ALL RESPONSES TO PHQ QUESTIONS 1-9: 1
10. IF YOU CHECKED OFF ANY PROBLEMS, HOW DIFFICULT HAVE THESE PROBLEMS MADE IT FOR YOU TO DO YOUR WORK, TAKE CARE OF THINGS AT HOME, OR GET ALONG WITH OTHER PEOPLE: NOT DIFFICULT AT ALL

## 2024-04-23 ASSESSMENT — PAIN SCALES - GENERAL: PAINLEVEL: NO PAIN (0)

## 2024-04-23 NOTE — PROGRESS NOTES
Preventive Care Visit  [facility]  Yen Torres MD, Family Medicine  Apr 23, 2024      Assessment & Plan     1. Routine general medical examination at a health care facility  Mammogram 10/23- advised repeat annually  Colon cancer screening with clear colonoscopy(nonbleeding internal hemmroids)  3/15- repeat in 10 yr 3/25  Pap smear completed 04/23/24, recommend 1 yr co-test due to recent LSIL, +HPV result in 2022.  Vaccinations offered for flu,covid,rsv    2. Cervical cancer screening  Plan: if normal pap- repeat ok in 5 yrs   - Pap Screen with HPV - recommended age 30 - 65 years    3. Uncontrolled hypertension  Plan:- Comprehensive metabolic panel (BMP + Alb, Alk Phos, ALT, AST, Total. Bili, TP); Future  - metoprolol succinate ER (TOPROL XL) 100 MG 24 hr tablet; Take 1 tablet (100 mg) by mouth daily  Dispense: 90 tablet; Refill: 3  - Albumin Random Urine Quantitative with Creat Ratio; Future  BP Readings from Last 6 Encounters:   04/23/24 (!) 150/70   01/26/24 (!) 158/91   09/06/23 (!) 155/83   03/28/23 126/75   03/22/23 (!) 146/87   11/08/22 (!) 155/89      4. Menopausal syndrome (hot flashes)  Plan: new medications   - cloNIDine (CATAPRES) 0.1 MG tablet; Take 1 tablet (0.1 mg) by mouth at bedtime  Dispense: 30 tablet; Refill: 11  Potential medication side effects were discussed with the patient; let me know if any occur.    5. Numerous moles    - Adult Dermatology  Referral; Future    6. Mild anxiety  Plan: yoga and exercise       7/28/2020    12:17 PM 4/23/2024     9:44 AM   THOMPSON-7 SCORE   Total Score  3 (minimal anxiety)   Total Score 9 3        Patient has been advised of split billing requirements and indicates understanding: Yes  Ordering of each unique test  Prescription drug management        Counseling  Appropriate preventive services were discussed with this patient, including applicable screening as appropriate for fall prevention, nutrition, physical activity,  Tobacco-use cessation, weight loss and cognition.  Checklist reviewing preventive services available has been given to the patient.  Reviewed patient's diet, addressing concerns and/or questions.   She is at risk for psychosocial distress and has been provided with information to reduce risk.           Dianna Ervin is a 63 year old, presenting for the following:  Physical  Mammogram 10/23- advised repeat annually  Colon cancer screening with clear colonoscopy(nonbleeding internal Hemorid)  3/15- repeat in 10 yr 3/25  Pap smear completed 04/23/24  Vaccinations offered for flu,covid,rsv    Hrt prescribed 9/2023  Abnormal mammogram- left breast  Cyst benign drained.  Mammogram otherwise normal 10/2023  stopped  HRT by pt after abnormal mammogram   Interested in medications in commercial for hot flashes    Managing anxiety with yooga and daily walk      4/23/2024     9:49 AM   Additional Questions   Roomed by ROCKY BARAHONA   Accompanied by ELENA ROSARIO         4/23/2024     9:49 AM   Patient Reported Additional Medications   Patient reports taking the following new medications N/A        Health Care Directive  Patient does not have a Health Care Directive or Living Will: Discussed advance care planning with patient; information given to patient to review.    HPIAnswers submitted by the patient for this visit:  Patient Health Questionnaire (Submitted on 4/23/2024)  If you checked off any problems, how difficult have these problems made it for you to do your work, take care of things at home, or get along with other people?: Not difficult at all  PHQ9 TOTAL SCORE: 1  THOMPSON-7 (Submitted on 4/23/2024)  THOMPSON 7 TOTAL SCORE: 3                4/23/2024   General Health   How would you rate your overall physical health? Good   Feel stress (tense, anxious, or unable to sleep) Only a little   (!) STRESS CONCERN      4/23/2024   Nutrition   Three or more servings of calcium each day? Yes   Diet: Low salt   How many servings of fruit and  vegetables per day? (!) 2-3   How many sweetened beverages each day? 0-1         4/23/2024   Exercise   Days per week of moderate/strenous exercise 7 days         4/23/2024   Social Factors   Frequency of gathering with friends or relatives Twice a week   Worry food won't last until get money to buy more No   Food not last or not have enough money for food? No   Do you have housing?  Yes   Are you worried about losing your housing? No   Lack of transportation? No   Unable to get utilities (heat,electricity)? No         4/23/2024   Fall Risk   Fallen 2 or more times in the past year? No   Trouble with walking or balance? No          4/23/2024   Dental   Dentist two times every year? Yes          Today's PHQ-9 Score:       4/23/2024     9:43 AM   PHQ-9 SCORE   PHQ-9 Total Score MyChart 1 (Minimal depression)   PHQ-9 Total Score 1         4/23/2024   Substance Use   Alcohol more than 3/day or more than 7/wk No   Do you use any other substances recreationally? No     Social History     Tobacco Use    Smoking status: Never    Smokeless tobacco: Never   Substance Use Topics    Alcohol use: Yes     Comment: 2 glasses 3 times a week    Drug use: Never             4/23/2024   Breast Cancer Screening   Family history of breast, colon, or ovarian cancer? No / Unknown         10/19/2023   LAST FHS-7 RESULTS   1st degree relative breast or ovarian cancer No   Any relative bilateral breast cancer No   Any male have breast cancer No   Any ONE woman have BOTH breast AND ovarian cancer No   Any woman with breast cancer before 50yrs No   2 or more relatives with breast AND/OR ovarian cancer No   2 or more relatives with breast AND/OR bowel cancer No        Mammogram Screening - Mammogram every 1-2 years updated in Health Maintenance based on mutual decision making        4/23/2024   STI Screening   New sexual partner(s) since last STI/HIV test? NO      History of abnormal Pap smear: NO - age 30-65 PAP every 5 years with negative HPV  "co-testing recommended        Latest Ref Rng & Units 11/8/2022     5:13 PM 9/21/2021     2:17 PM 12/19/2017    12:00 AM   PAP / HPV   PAP  Low-grade squamous intraepithelial lesion (LSIL) encompassing HPV/mild dysplasia/CIN1  Negative for Intraepithelial Lesion or Malignancy (NILM)     HPV 16 DNA Negative Negative  Negative     HPV 18 DNA Negative Negative  Negative     Other HR HPV Negative Positive  Negative     PAP-ABSTRACT    See Scanned Document           This result is from an external source.     ASCVD Risk   The ASCVD Risk score (Brennen WU, et al., 2019) failed to calculate for the following reasons:    The valid HDL cholesterol range is 20 to 100 mg/dL           Reviewed and updated as needed this visit by Provider   Tobacco  Allergies  Meds  Problems  Med Hx  Surg Hx  Fam Hx            BP Readings from Last 3 Encounters:   04/23/24 (!) 150/70   01/26/24 (!) 158/91   09/06/23 (!) 155/83    Wt Readings from Last 3 Encounters:   04/23/24 68 kg (150 lb)   01/26/24 70.9 kg (156 lb 3.2 oz)   09/06/23 69.6 kg (153 lb 6.4 oz)                      Review of Systems  Constitutional, neuro, ENT, endocrine, pulmonary, cardiac, gastrointestinal, genitourinary, musculoskeletal, integument and psychiatric systems are negative, except as otherwise noted.     Objective    Exam  BP (!) 150/70 (BP Location: Right arm, Patient Position: Sitting, Cuff Size: Adult Regular)   Pulse 62   Temp 97.2  F (36.2  C) (Temporal)   Resp 19   Ht 1.727 m (5' 8\")   Wt 68 kg (150 lb)   LMP  (LMP Unknown)   SpO2 98%   BMI 22.81 kg/m     Estimated body mass index is 22.81 kg/m  as calculated from the following:    Height as of this encounter: 1.727 m (5' 8\").    Weight as of this encounter: 68 kg (150 lb).    Physical Exam  GENERAL: alert and no distress  EYES: Eyes grossly normal to inspection, PERRL and conjunctivae and sclerae normal  HENT: ear canals and TM's normal, nose and mouth without ulcers or lesions  NECK: no " adenopathy, no asymmetry, masses, or scars  RESP: lungs clear to auscultation - no rales, rhonchi or wheezes  BREAST: normal without masses, tenderness or nipple discharge and no palpable axillary masses or adenopathy  CV: regular rate and rhythm, normal S1 S2, no S3 or S4, no murmur, click or rub, no peripheral edema  ABDOMEN: soft, nontender, no hepatosplenomegaly, no masses and bowel sounds normal   (female) w/bimanual: normal female external genitalia, normal urethral meatus, pale  vaginal mucosa, and normal cervix/adnexa/uterus without masses or discharge. Pap smear is sent  MS: no gross musculoskeletal defects noted, no edema  SKIN: no suspicious lesions or rashes  NEURO: Normal strength and tone, mentation intact and speech normal  PSYCH: mentation appears normal, affect normal/bright        Signed Electronically by: Yen Torres MD

## 2024-04-23 NOTE — PATIENT INSTRUCTIONS
1. Routine general medical examination at a health care facility  Mammogram 10/23- advised repeat annually  Colon cancer screening with clear colonoscopy(nonbleeding internal hemmroids)  3/15- repeat in 10 yr 3/25  Pap smear completed 04/23/24  Vaccinations offered for flu,covid,rsv    2. Cervical cancer screening  - Pap Screen with HPV - recommended age 30 - 65 years    3. Uncontrolled hypertension  Plan: - metoprolol succinate ER (TOPROL XL) 100 MG 24 hr tablet; Take 1 tablet (100 mg) by mouth daily  Dispense: 30 tablet; Refill: 3    [id number] at noon central time a telephone only visit with me for follow up on medication for hypertension and hot flashes      - Comprehensive metabolic panel (BMP + Alb, Alk Phos, ALT, AST, Total. Bili, TP); Future      4. Menopausal syndrome (hot flashes)    - cloNIDine (CATAPRES) 0.1 MG tablet; Take 1 tablet (0.1 mg) by mouth at bedtime  Dispense: 30 tablet; Refill: 11          Preventive Care Advice   This is general advice given by our system to help you stay healthy. However, your care team may have specific advice just for you. Please talk to your care team about your preventive care needs.  Nutrition  Eat 5 or more servings of fruits and vegetables each day.  Try wheat bread, brown rice and whole grain pasta (instead of white bread, rice, and pasta).  Get enough calcium and vitamin D. Check the label on foods and aim for 100% of the RDA (recommended daily allowance).  Lifestyle  Exercise at least 150 minutes each week   (30 minutes a day, 5 days a week).  Do muscle strengthening activities 2 days a week. These help control your weight and prevent disease.  No smoking.  Wear sunscreen to prevent skin cancer.  Have a dental exam and cleaning every 6 months.  Yearly exams  See your health care team every year to talk about:  Any changes in your health.  Any medicines your care team has prescribed.  Preventive care, family planning, and ways to prevent chronic diseases.  Shots  (vaccines)   HPV shots (up to age 26), if you've never had them before.  Hepatitis B shots (up to age 59), if you've never had them before.  COVID-19 shot: Get this shot when it's due.  Flu shot: Get a flu shot every year.  Tetanus shot: Get a tetanus shot every 10 years.  Pneumococcal, hepatitis A, and RSV shots: Ask your care team if you need these based on your risk.  Shingles shot (for age 50 and up).  General health tests  Diabetes screening:  Starting at age 35, Get screened for diabetes at least every 3 years.  If you are younger than age 35, ask your care team if you should be screened for diabetes.  Cholesterol test: At age 39, start having a cholesterol test every 5 years, or more often if advised.  Bone density scan (DEXA): At age 50, ask your care team if you should have this scan for osteoporosis (brittle bones).  Hepatitis C: Get tested at least once in your life.  STIs (sexually transmitted infections)  Before age 24: Ask your care team if you should be screened for STIs.  After age 24: Get screened for STIs if you're at risk. You are at risk for STIs (including HIV) if:  You are sexually active with more than one person.  You don't use condoms every time.  You or a partner was diagnosed with a sexually transmitted infection.  If you are at risk for HIV, ask about PrEP medicine to prevent HIV.  Get tested for HIV at least once in your life, whether you are at risk for HIV or not.  Cancer screening tests  Cervical cancer screening: If you have a cervix, begin getting regular cervical cancer screening tests at age 21. Most people who have regular screenings with normal results can stop after age 65. Talk about this with your provider.  Breast cancer scan (mammogram): If you've ever had breasts, begin having regular mammograms starting at age 40. This is a scan to check for breast cancer.  Colon cancer screening: It is important to start screening for colon cancer at age 45.  Have a colonoscopy test  every 10 years (or more often if you're at risk) Or, ask your provider about stool tests like a FIT test every year or Cologuard test every 3 years.  To learn more about your testing options, visit: https://[email or web address]/654398.pdf.  For help making a decision, visit: https://bit.ly/sc94970.  Prostate cancer screening test: If you have a prostate and are age 55 to 69, ask your provider if you would benefit from a yearly prostate cancer screening test.  Lung cancer screening: If you are a current or former smoker age 50 to 80, ask your care team if ongoing lung cancer screenings are right for you.  For informational purposes only. Not to replace the advice of your health care provider. Copyright   2023 [employer]. All rights reserved. Clinically reviewed by the [facility] Transitions [facility]. [employer] [id number] - REV 01/24.    Learning About Stress  What is stress?     Stress is your body's response to a hard situation. Your body can have a physical, emotional, or mental response. Stress is a fact of life for most people, and it affects everyone differently. What causes stress for you may not be stressful for someone else.  A lot of things can cause stress. You may feel stress when you go on a job interview, take a test, or run a race. This kind of short-term stress is normal and even useful. It can help you if you need to work hard or react quickly. For example, stress can help you finish an important job on time.  Long-term stress is caused by ongoing stressful situations or events. Examples of long-term stress include long-term health problems, ongoing problems at work, or conflicts in your family. Long-term stress can harm your health.  How does stress affect your health?  When you are stressed, your body responds as though you are in danger. It makes hormones that speed up your heart, make you breathe faster, and give you a burst of energy. This is called the fight-or-flight stress  response. If the stress is over quickly, your body goes back to normal and no harm is done.  But if stress happens too often or lasts too long, it can have bad effects. Long-term stress can make you more likely to get sick, and it can make symptoms of some diseases worse. If you tense up when you are stressed, you may develop neck, shoulder, or low back pain. Stress is linked to high blood pressure and heart disease.  Stress also harms your emotional health. It can make you fung, tense, or depressed. Your relationships may suffer, and you may not do well at work or school.  What can you do to manage stress?  You can try these things to help manage stress:   Do something active. Exercise or activity can help reduce stress. Walking is a great way to get started. Even everyday activities such as housecleaning or yard work can help.  Try yoga or angel chi. These techniques combine exercise and meditation. You may need some training at first to learn them.  Do something you enjoy. For example, listen to music or go to a movie. Practice your hobby or do volunteer work.  Meditate. This can help you relax, because you are not worrying about what happened before or what may happen in the future.  Do guided imagery. Imagine yourself in any setting that helps you feel calm. You can use online videos, books, or a teacher to guide you.  Do breathing exercises. For example:  From a standing position, bend forward from the waist with your knees slightly bent. Let your arms dangle close to the floor.  Breathe in slowly and deeply as you return to a standing position. Roll up slowly and lift your head last.  Hold your breath for just a few seconds in the standing position.  Breathe out slowly and bend forward from the waist.  Let your feelings out. Talk, laugh, cry, and express anger when you need to. Talking with supportive friends or family, a counselor, or a osvaldo leader about your feelings is a healthy way to relieve stress.  "Avoid discussing your feelings with people who make you feel worse.  Write. It may help to write about things that are bothering you. This helps you find out how much stress you feel and what is causing it. When you know this, you can find better ways to cope.  What can you do to prevent stress?  You might try some of these things to help prevent stress:  Manage your time. This helps you find time to do the things you want and need to do.  Get enough sleep. Your body recovers from the stresses of the day while you are sleeping.  Get support. Your family, friends, and community can make a difference in how you experience stress.  Limit your news feed. Avoid or limit time on social media or news that may make you feel stressed.  Do something active. Exercise or activity can help reduce stress. Walking is a great way to get started.  Where can you learn more?  Go to https://www.[employer] Plus/patiented  Enter N032 in the search box to learn more about \"Learning About Stress.\"  Current as of: October 24, 2023               Content Version: 14.0    3022-8445 [employer].   Care instructions adapted under license by your healthcare professional. If you have questions about a medical condition or this instruction, always ask your healthcare professional. [employer] disclaims any warranty or liability for your use of this information.      Safer Sex: Care Instructions  Overview  Safer sex is a way to reduce your risk of getting a sexually transmitted infection (STI). It can also help prevent pregnancy.  Several products can help you practice safer sex and reduce your chance of STIs. One of the best is a condom. There are internal and external condoms. You can use a special rubber sheet (dental dam) for protection during oral sex. Disposable gloves can keep your hands from touching blood, semen, or other body fluids that can carry infections.  Remember that birth control methods such as diaphragms, " IUDs, foams, and birth control pills do not stop you from getting STIs.  Follow-up care is a key part of your treatment and safety. Be sure to make and go to all appointments, and call your doctor if you are having problems. It's also a good idea to know your test results and keep a list of the medicines you take.  How can you care for yourself at home?  Think about getting vaccinated to help prevent hepatitis A, hepatitis B, and human papillomavirus (HPV). They can be spread through sex.  Use a condom every time you have sex. Use an external condom, which goes on the penis. Or use an internal condom, which goes into the vagina or anus.  Make sure you use the right size external condom. A condom that's too small can break easily. A condom that's too big can slip off during sex.  Use a new condom each time you have sex. Be careful not to poke a hole in the condom when you open the wrapper.  Don't use an internal condom and an external condom at the same time.  Never use petroleum jelly (such as Vaseline), grease, hand lotion, baby oil, or anything with oil in it. These products can make holes in the condom.  After intercourse, hold the edge of the condom as you remove it. This will help keep semen from spilling out of the condom.  Do not have sex with anyone who has symptoms of an STI, such as sores on the genitals or mouth.  Do not drink a lot of alcohol or use drugs before sex.  Limit your sex partners. Sex with one partner who has sex only with you can reduce your risk of getting an STI.  Don't share sex toys. But if you do share them, use a condom and clean the sex toys between each use.  Talk to any partners before you have sex. Talk about what you feel comfortable with and whether you have any boundaries with sex. And find out if your partner or partners may be at risk for any STI. Keep in mind that a person may be able to spread an STI even if they do not have symptoms. You and any partners may want to get  "tested for STIs.  Where can you learn more?  Go to https://www.[employer].net/patiented  Enter B608 in the search box to learn more about \"Safer Sex: Care Instructions.\"  Current as of: November 27, 2023               Content Version: 14.0    1501-5077 [employer].   Care instructions adapted under license by your healthcare professional. If you have questions about a medical condition or this instruction, always ask your healthcare professional. [employer], [employer] disclaims any warranty or liability for your use of this information.      "

## 2024-04-24 LAB
ALBUMIN SERPL BCG-MCNC: 5 G/DL (ref 3.5–5.2)
ALP SERPL-CCNC: 78 U/L (ref 40–150)
ALT SERPL W P-5'-P-CCNC: 20 U/L (ref 0–50)
ANION GAP SERPL CALCULATED.3IONS-SCNC: 13 MMOL/L (ref 7–15)
AST SERPL W P-5'-P-CCNC: 23 U/L (ref 0–45)
BILIRUB SERPL-MCNC: 1.2 MG/DL
BUN SERPL-MCNC: 13.5 MG/DL (ref 8–23)
CALCIUM SERPL-MCNC: 9.8 MG/DL (ref 8.8–10.2)
CHLORIDE SERPL-SCNC: 100 MMOL/L (ref 98–107)
CREAT SERPL-MCNC: 0.66 MG/DL (ref 0.51–0.95)
DEPRECATED HCO3 PLAS-SCNC: 25 MMOL/L (ref 22–29)
EGFRCR SERPLBLD CKD-EPI 2021: >90 ML/MIN/1.73M2
GLUCOSE SERPL-MCNC: 103 MG/DL (ref 70–99)
POTASSIUM SERPL-SCNC: 4.4 MMOL/L (ref 3.4–5.3)
PROT SERPL-MCNC: 7.8 G/DL (ref 6.4–8.3)
SODIUM SERPL-SCNC: 138 MMOL/L (ref 135–145)

## 2024-04-26 LAB
BKR LAB AP GYN ADEQUACY: NORMAL
BKR LAB AP GYN INTERPRETATION: NORMAL
BKR LAB AP HPV REFLEX: NORMAL
BKR LAB AP PREVIOUS ABNORMAL: NORMAL
PATH REPORT.COMMENTS IMP SPEC: NORMAL
PATH REPORT.COMMENTS IMP SPEC: NORMAL
PATH REPORT.RELEVANT HX SPEC: NORMAL

## 2024-04-30 PROBLEM — Z98.890 HISTORY OF LOOP ELECTRICAL EXCISION PROCEDURE (LEEP): Status: ACTIVE | Noted: 2021-02-24

## 2024-05-16 ENCOUNTER — TELEPHONE (OUTPATIENT)
Dept: FAMILY MEDICINE | Facility: CLINIC | Age: 64
End: 2024-05-16
Payer: COMMERCIAL

## 2024-05-16 NOTE — TELEPHONE ENCOUNTER
Sending as 3rd request for recommendations, please see below:    Co-test = Normal, Neg HPV     History:  Hx of LEEP, 27 yrs old per pt.    12/19/17 NIL Pap, Neg HR HPV- recheck ok in 12/22 09/21/21 NIL Pap, Neg HR HPV Plan cotest in 1 year due 09/21/22.  11/8/22 LSIL Pap, + HR HPV (neg 16/18). Cotest in 1 year.  4/23/24 NIL pap, Neg HPV (current)     Hi Dr. Torres - Please advise, patient with distant history of LEEP and last co-test in 2022 was LSIL, +HPV.  Recent visit notes have plan for 5 yr co-testing.     Ok to instead recommend 1 yr co-test due to recent LSIL, +HPV result in 2022?

## 2024-05-17 ENCOUNTER — PATIENT OUTREACH (OUTPATIENT)
Dept: FAMILY MEDICINE | Facility: CLINIC | Age: 64
End: 2024-05-17
Payer: COMMERCIAL

## 2024-05-17 NOTE — TELEPHONE ENCOUNTER
Chart/tracking updated.   Cotest due in 1 year    Provider message copied and pasted:    Yen Torres MD  Up Pap     AS  Yes, please :recommend 1 yr co-test due to recent LSIL, +HPV result in 2022

## 2024-12-16 ENCOUNTER — NURSE TRIAGE (OUTPATIENT)
Dept: FAMILY MEDICINE | Facility: CLINIC | Age: 64
End: 2024-12-16
Payer: COMMERCIAL

## 2024-12-16 NOTE — TELEPHONE ENCOUNTER
Please call patient   Metoprolol comes in 100 mg - so its probably typo    Give appointment with me as OV or VV

## 2024-12-16 NOTE — TELEPHONE ENCOUNTER
Nurse Triage SBAR    Is this a 2nd Level Triage? YES, LICENSED PRACTITIONER REVIEW IS REQUIRED    Situation:   Patient calling with concerns of high blood pressure     Background:   BP spiking over past week  Metoprolol 10 mg QAM     Assessment:   198/103 - 12/16  Current at 1:05 /99 - took pills at 10 AM   Mild, dull headache - this is how she knows her BP is elevated - severe headache a few days ago   Denies chest pain   Denies dizziness   Denies vision changes     Protocol Recommended Disposition:   Go to ED Now    Recommendation:   Due to headache writer recommend patient be evaluated in ER, Patient refused ER or Urgent care and wants to be seen by PCP who has been managing her blood pressure.   Writer able to schedule patient with PCP tomorrow in clinic for appointment.    Routed to provider    Does the patient meet one of the following criteria for ADS visit consideration? 16+ years old, with an [facility] PCP     TIP  Providers, please consider if this condition is appropriate for management at one of our Acute and Diagnostic Services sites.     If patient is a good candidate, please use dotphrase <dot>triageresponse and select Refer to ADS to document.    Reason for Disposition   Systolic BP >= 160 OR Diastolic >= 100, and any cardiac (e.g., breathing difficulty, chest pain) or neurologic symptoms (e.g., new-onset blurred or double vision)    Additional Information   Negative: Sounds like a life-threatening emergency to the triager    Protocols used: Blood Pressure - High-A-OH      MARIAA Moody, RN  [facility]

## 2024-12-16 NOTE — TELEPHONE ENCOUNTER
A.S,    No appt available today. Would you like us to double book pt or is tomorrow ok (appt scheduled tomorrow at 3 pm).     Thank you,  Elidia ESCOTO RN

## 2024-12-16 NOTE — TELEPHONE ENCOUNTER
Called pt and relayed that PCP can see her in office tomorrow at 0900; arrival time 0840. Pt agreeable and appreciative of it.   Elidia ESCOTO RN

## 2024-12-17 ENCOUNTER — OFFICE VISIT (OUTPATIENT)
Dept: FAMILY MEDICINE | Facility: CLINIC | Age: 64
End: 2024-12-17
Payer: COMMERCIAL

## 2024-12-17 VITALS
WEIGHT: 153.1 LBS | BODY MASS INDEX: 23.2 KG/M2 | HEART RATE: 58 BPM | HEIGHT: 68 IN | OXYGEN SATURATION: 97 % | TEMPERATURE: 97.8 F | RESPIRATION RATE: 16 BRPM | DIASTOLIC BLOOD PRESSURE: 83 MMHG | SYSTOLIC BLOOD PRESSURE: 143 MMHG

## 2024-12-17 DIAGNOSIS — I10 HYPERTENSION GOAL BP (BLOOD PRESSURE) < 140/90: ICD-10-CM

## 2024-12-17 DIAGNOSIS — E78.5 HYPERLIPIDEMIA LDL GOAL <130: ICD-10-CM

## 2024-12-17 DIAGNOSIS — I10 UNCONTROLLED HYPERTENSION: Primary | ICD-10-CM

## 2024-12-17 LAB
ANION GAP SERPL CALCULATED.3IONS-SCNC: 11 MMOL/L (ref 7–15)
BUN SERPL-MCNC: 13.5 MG/DL (ref 8–23)
CALCIUM SERPL-MCNC: 9.4 MG/DL (ref 8.8–10.4)
CHLORIDE SERPL-SCNC: 102 MMOL/L (ref 98–107)
CHOLEST SERPL-MCNC: 216 MG/DL
CREAT SERPL-MCNC: 0.74 MG/DL (ref 0.51–0.95)
EGFRCR SERPLBLD CKD-EPI 2021: >90 ML/MIN/1.73M2
FASTING STATUS PATIENT QL REPORTED: YES
FASTING STATUS PATIENT QL REPORTED: YES
GLUCOSE SERPL-MCNC: 98 MG/DL (ref 70–99)
HCO3 SERPL-SCNC: 26 MMOL/L (ref 22–29)
HDLC SERPL-MCNC: 100 MG/DL
LDLC SERPL CALC-MCNC: 105 MG/DL
NONHDLC SERPL-MCNC: 116 MG/DL
POTASSIUM SERPL-SCNC: 4.3 MMOL/L (ref 3.4–5.3)
SODIUM SERPL-SCNC: 139 MMOL/L (ref 135–145)
TRIGL SERPL-MCNC: 56 MG/DL

## 2024-12-17 PROCEDURE — 80048 BASIC METABOLIC PNL TOTAL CA: CPT | Performed by: FAMILY MEDICINE

## 2024-12-17 PROCEDURE — 99214 OFFICE O/P EST MOD 30 MIN: CPT | Performed by: FAMILY MEDICINE

## 2024-12-17 PROCEDURE — 36415 COLL VENOUS BLD VENIPUNCTURE: CPT | Performed by: FAMILY MEDICINE

## 2024-12-17 PROCEDURE — 80061 LIPID PANEL: CPT | Performed by: FAMILY MEDICINE

## 2024-12-17 PROCEDURE — G2211 COMPLEX E/M VISIT ADD ON: HCPCS | Performed by: FAMILY MEDICINE

## 2024-12-17 RX ORDER — AMLODIPINE BESYLATE 2.5 MG/1
2.5 TABLET ORAL DAILY
Qty: 30 TABLET | Refills: 1 | Status: SHIPPED | OUTPATIENT
Start: 2024-12-17

## 2024-12-17 RX ORDER — AMLODIPINE BESYLATE 2.5 MG/1
2.5 TABLET ORAL DAILY
Qty: 90 TABLET | OUTPATIENT
Start: 2024-12-17

## 2024-12-17 ASSESSMENT — PAIN SCALES - GENERAL: PAINLEVEL_OUTOF10: NO PAIN (0)

## 2024-12-17 NOTE — PATIENT INSTRUCTIONS
1. Hypertension goal BP (blood pressure) < 140/90 (Primary)  Plan: Low salt diet.  Add new medications - amLODIPine (NORVASC) 2.5 MG tablet; Take 1 tablet (2.5 mg) by mouth daily.  Dispense: 30 tablet; Refill: 1  Continue metoprolol 100 mg once daily   Follow up in clinic in 2-4 weeks    - Basic metabolic panel  (Ca, Cl, CO2, Creat, Gluc, K, Na, BUN); Future    2. Hyperlipidemia LDL goal <130  Mediterranean diet  - Lipid panel reflex to direct LDL Fasting; Future  - CT Coronary Calcium Scan; Future

## 2024-12-17 NOTE — PROGRESS NOTES
Assessment & Plan     1. Hypertension goal BP (blood pressure) < 140/90 (Primary)uncontrolled hypertension-  Plan: Add new med - amLODIPine (NORVASC) 2.5 MG tablet; Take 1 tablet (2.5 mg) by mouth daily.  Dispense: 30 tablet; Refill: 1  Continue metoprolol 100 mg once daily   Follow up in clinic in 2-4 weeks      - Basic metabolic panel  (Ca, Cl, CO2, Creat, Gluc, K, Na, BUN); Future    We discussed medications and Blood pressure management- in detail   Blood pressure has not been controlled as far as previous follow up goes even in 04/2024  and as far as last night is concerned - I can not explain why systolic was up to 190, stress is a contributor, that she does endorse and also she takes metoprolol at bedtime , so in future advised to take Blood pressure when most relaxed , possibly  stress induced elevation of Blood pressure    Continue with Low salt diet.  Follow up in office or Virtual visit in 2-4 weeks to make sure that Blood pressure is actually well controlled with combination of above medications           2. Hyperlipidemia LDL goal <130  Mediterranean diet  - Lipid panel reflex to direct LDL Fasting; Future  - CT Coronary Calcium Scan; Future    Recent Labs   Lab Test 09/21/21  1537 02/24/21  1026   CHOL 255* 234*    120   * 100*   TRIG 101 71    The ASCVD Risk score (Brennen DK, et al., 2019) failed to calculate for the following reasons:    The valid HDL cholesterol range is 20 to 100 mg/dL            The longitudinal plan of care for the diagnosis(es)/condition(s) as documented were addressed during this visit. Due to the added complexity in care, I will continue to support Aziza in the subsequent management and with ongoing continuity of care.Ordering of each unique test  Prescription drug management  I spent a total of 30 minutes on the day of the visit.   Time spent by me today doing chart review, history and exam, documentation and further activities per the note    Subjective  "  Aziza is a 64 year old, presenting for the following health issues:  Hypertension    History of Present Illness       Hypertension: She presents for follow up of hypertension.  She does check blood pressure  (feels dizzy and headache) regularly outside of the clinic. Outside blood pressures have been over 140/90. (190/104) She does not follow a low salt diet.     She eats 2-3 servings of fruits and vegetables daily.She consumes 0 sweetened beverage(s) daily.She exercises with enough effort to increase her heart rate 20 to 29 minutes per day.  She exercises with enough effort to increase her heart rate 6 days per week.   She is taking medications regularly.     The ASCVD Risk score (Brennen WU, et al., 2019) failed to calculate for the following reasons:    The valid HDL cholesterol range is 20 to 100 mg/dL      High Blood pressure at home.  Last night up to 190  [place] it as headache   Takes metoprolol at bedtime   Checked Blood pressure at work, it was high as well    Feels the holiday- rush and has trip to [place] in and out for 2 days to celebrate xochilt with her  daughter  Reports no concerns with slurred speech, chest pain, shortness of breath with high Blood pressure but did have headache without vision concerns, loss of balance.  Does endorse feeling extremely anxious about why Blood pressure is high.  Busy life style- was not able to follow up regarding Blood pressure since last office visit in April 2024    Is fasting for labs for fasting lipid as well          Review of Systems  Constitutional, neuro, ENT, endocrine, pulmonary, cardiac, gastrointestinal, genitourinary, musculoskeletal, integument and psychiatric systems are negative, except as otherwise noted.      Objective    BP (!) 143/83 (BP Location: Left arm, Patient Position: Sitting, Cuff Size: Adult Regular)   Pulse 58   Temp 97.8  F (36.6  C) (Temporal)   Resp 16   Ht 1.727 m (5' 8\")   Wt 69.4 kg (153 lb 1.6 oz)   LMP  (LMP Unknown)   " SpO2 97%   BMI 23.28 kg/m    Body mass index is 23.28 kg/m .  Physical Exam   GENERAL: alert and no distress  NECK: no adenopathy, no asymmetry, masses, or scars  RESP: lungs clear to auscultation - no rales, rhonchi or wheezes  CV: regular rate and rhythm, normal S1 S2, no S3 or S4, no murmur, click or rub, no peripheral edema  ABDOMEN: soft, nontender, no hepatosplenomegaly, no masses and bowel sounds normal  PSYCH: mentation appears normal, affect normal/bright    No results found for this or any previous visit (from the past 24 hours).        Signed Electronically by: Yen Torres MD

## 2025-02-05 ENCOUNTER — TELEPHONE (OUTPATIENT)
Dept: FAMILY MEDICINE | Facility: CLINIC | Age: 65
End: 2025-02-05
Payer: COMMERCIAL

## 2025-02-05 DIAGNOSIS — I10 HYPERTENSION GOAL BP (BLOOD PRESSURE) < 140/90: Primary | ICD-10-CM

## 2025-02-05 NOTE — TELEPHONE ENCOUNTER
Hi,    This is Jose Agudelo PharmD from [facility]. We are attempting to transfer an order for amlodipine 2.5mg tablets from the [facility] in [place] but they are not answering their phones in a timely manner. Would you be able to just send over a new Erx for amlodipine 2.5mg tablets. Thanks!    Jose Agudelo PharmD  [facility]  [address] [place] [id number]  [phone]

## 2025-02-06 RX ORDER — AMLODIPINE BESYLATE 2.5 MG/1
2.5 TABLET ORAL DAILY
Qty: 30 TABLET | Refills: 1 | Status: CANCELLED | OUTPATIENT
Start: 2025-02-06

## 2025-02-06 RX ORDER — AMLODIPINE BESYLATE 2.5 MG/1
2.5 TABLET ORAL DAILY
Qty: 30 TABLET | Refills: 0 | Status: SHIPPED | OUTPATIENT
Start: 2025-02-06

## 2025-02-06 NOTE — TELEPHONE ENCOUNTER
Team,    Please reach out to patient and assist with scheduling follow up appointment per provider request.    Thanks,  Kamla SPRAGUE RN

## 2025-03-12 DIAGNOSIS — I10 HYPERTENSION GOAL BP (BLOOD PRESSURE) < 140/90: ICD-10-CM

## 2025-03-12 NOTE — TELEPHONE ENCOUNTER
Refill request. Med pended with requested pharmacy location.  Pt reports taking this medication daily, as prescribed.   Pt has 3 pills left until she is completely out.   Pt plans to check in with provider about this medication at upcoming appointment scheduled for 4/22/25.     Elisa DUMONT RN

## 2025-03-13 RX ORDER — AMLODIPINE BESYLATE 2.5 MG/1
2.5 TABLET ORAL DAILY
Qty: 30 TABLET | Refills: 0 | Status: SHIPPED | OUTPATIENT
Start: 2025-03-13

## 2025-04-10 ENCOUNTER — PATIENT OUTREACH (OUTPATIENT)
Dept: FAMILY MEDICINE | Facility: CLINIC | Age: 65
End: 2025-04-10
Payer: COMMERCIAL

## 2025-04-10 DIAGNOSIS — Z98.890 HISTORY OF LOOP ELECTRICAL EXCISION PROCEDURE (LEEP): Primary | ICD-10-CM

## 2025-04-16 DIAGNOSIS — I10 HYPERTENSION GOAL BP (BLOOD PRESSURE) < 140/90: ICD-10-CM

## 2025-04-16 DIAGNOSIS — I10 UNCONTROLLED HYPERTENSION: ICD-10-CM

## 2025-04-16 RX ORDER — AMLODIPINE BESYLATE 2.5 MG/1
2.5 TABLET ORAL DAILY
Qty: 30 TABLET | Refills: 0 | Status: SHIPPED | OUTPATIENT
Start: 2025-04-16

## 2025-04-16 RX ORDER — METOPROLOL SUCCINATE 100 MG/1
100 TABLET, EXTENDED RELEASE ORAL DAILY
Qty: 30 TABLET | Refills: 0 | Status: SHIPPED | OUTPATIENT
Start: 2025-04-16

## 2025-04-22 ENCOUNTER — OFFICE VISIT (OUTPATIENT)
Dept: FAMILY MEDICINE | Facility: CLINIC | Age: 65
End: 2025-04-22
Payer: COMMERCIAL

## 2025-04-22 ENCOUNTER — ORDERS ONLY (AUTO-RELEASED) (OUTPATIENT)
Dept: FAMILY MEDICINE | Facility: CLINIC | Age: 65
End: 2025-04-22

## 2025-04-22 VITALS
OXYGEN SATURATION: 98 % | DIASTOLIC BLOOD PRESSURE: 76 MMHG | HEART RATE: 47 BPM | RESPIRATION RATE: 16 BRPM | SYSTOLIC BLOOD PRESSURE: 148 MMHG | WEIGHT: 153 LBS | TEMPERATURE: 97.9 F | BODY MASS INDEX: 23.26 KG/M2

## 2025-04-22 DIAGNOSIS — Z12.11 SCREEN FOR COLON CANCER: ICD-10-CM

## 2025-04-22 DIAGNOSIS — S16.1XXA NECK STRAIN, INITIAL ENCOUNTER: ICD-10-CM

## 2025-04-22 DIAGNOSIS — Z00.00 ROUTINE GENERAL MEDICAL EXAMINATION AT A HEALTH CARE FACILITY: Primary | ICD-10-CM

## 2025-04-22 DIAGNOSIS — R06.83 LOUD SNORING: ICD-10-CM

## 2025-04-22 DIAGNOSIS — I10 HYPERTENSION GOAL BP (BLOOD PRESSURE) < 140/90: ICD-10-CM

## 2025-04-22 DIAGNOSIS — Z12.31 ENCOUNTER FOR SCREENING MAMMOGRAM FOR MALIGNANT NEOPLASM OF BREAST: ICD-10-CM

## 2025-04-22 DIAGNOSIS — E78.5 HYPERLIPIDEMIA LDL GOAL <100: ICD-10-CM

## 2025-04-22 DIAGNOSIS — Z12.4 SCREENING FOR CERVICAL CANCER: ICD-10-CM

## 2025-04-22 PROCEDURE — 3078F DIAST BP <80 MM HG: CPT | Performed by: FAMILY MEDICINE

## 2025-04-22 PROCEDURE — 3077F SYST BP >= 140 MM HG: CPT | Performed by: FAMILY MEDICINE

## 2025-04-22 PROCEDURE — 87624 HPV HI-RISK TYP POOLED RSLT: CPT | Performed by: FAMILY MEDICINE

## 2025-04-22 PROCEDURE — 99213 OFFICE O/P EST LOW 20 MIN: CPT | Mod: 25 | Performed by: FAMILY MEDICINE

## 2025-04-22 PROCEDURE — 82570 ASSAY OF URINE CREATININE: CPT | Performed by: FAMILY MEDICINE

## 2025-04-22 PROCEDURE — 99396 PREV VISIT EST AGE 40-64: CPT | Performed by: FAMILY MEDICINE

## 2025-04-22 PROCEDURE — 82043 UR ALBUMIN QUANTITATIVE: CPT | Performed by: FAMILY MEDICINE

## 2025-04-22 PROCEDURE — G0145 SCR C/V CYTO,THINLAYER,RESCR: HCPCS | Performed by: FAMILY MEDICINE

## 2025-04-22 RX ORDER — METOPROLOL SUCCINATE 100 MG/1
100 TABLET, EXTENDED RELEASE ORAL DAILY
Qty: 90 TABLET | Refills: 3 | Status: SHIPPED | OUTPATIENT
Start: 2025-04-22

## 2025-04-22 RX ORDER — METOPROLOL SUCCINATE 100 MG/1
100 TABLET, EXTENDED RELEASE ORAL DAILY
Qty: 90 TABLET | Refills: 3 | Status: SHIPPED | OUTPATIENT
Start: 2025-04-22 | End: 2025-04-22

## 2025-04-22 RX ORDER — AMLODIPINE BESYLATE 5 MG/1
5 TABLET ORAL DAILY
Qty: 90 TABLET | Refills: 1 | Status: SHIPPED | OUTPATIENT
Start: 2025-04-22

## 2025-04-22 SDOH — HEALTH STABILITY: PHYSICAL HEALTH: ON AVERAGE, HOW MANY MINUTES DO YOU ENGAGE IN EXERCISE AT THIS LEVEL?: 30 MIN

## 2025-04-22 SDOH — HEALTH STABILITY: PHYSICAL HEALTH: ON AVERAGE, HOW MANY DAYS PER WEEK DO YOU ENGAGE IN MODERATE TO STRENUOUS EXERCISE (LIKE A BRISK WALK)?: 6 DAYS

## 2025-04-22 ASSESSMENT — ANXIETY QUESTIONNAIRES
GAD7 TOTAL SCORE: 1
3. WORRYING TOO MUCH ABOUT DIFFERENT THINGS: NOT AT ALL
2. NOT BEING ABLE TO STOP OR CONTROL WORRYING: NOT AT ALL
7. FEELING AFRAID AS IF SOMETHING AWFUL MIGHT HAPPEN: NOT AT ALL
6. BECOMING EASILY ANNOYED OR IRRITABLE: NOT AT ALL
GAD7 TOTAL SCORE: 1
IF YOU CHECKED OFF ANY PROBLEMS ON THIS QUESTIONNAIRE, HOW DIFFICULT HAVE THESE PROBLEMS MADE IT FOR YOU TO DO YOUR WORK, TAKE CARE OF THINGS AT HOME, OR GET ALONG WITH OTHER PEOPLE: NOT DIFFICULT AT ALL
4. TROUBLE RELAXING: SEVERAL DAYS
5. BEING SO RESTLESS THAT IT IS HARD TO SIT STILL: NOT AT ALL
GAD7 TOTAL SCORE: 1
8. IF YOU CHECKED OFF ANY PROBLEMS, HOW DIFFICULT HAVE THESE MADE IT FOR YOU TO DO YOUR WORK, TAKE CARE OF THINGS AT HOME, OR GET ALONG WITH OTHER PEOPLE?: NOT DIFFICULT AT ALL
7. FEELING AFRAID AS IF SOMETHING AWFUL MIGHT HAPPEN: NOT AT ALL
1. FEELING NERVOUS, ANXIOUS, OR ON EDGE: NOT AT ALL

## 2025-04-22 ASSESSMENT — PATIENT HEALTH QUESTIONNAIRE - PHQ9
SUM OF ALL RESPONSES TO PHQ QUESTIONS 1-9: 1
10. IF YOU CHECKED OFF ANY PROBLEMS, HOW DIFFICULT HAVE THESE PROBLEMS MADE IT FOR YOU TO DO YOUR WORK, TAKE CARE OF THINGS AT HOME, OR GET ALONG WITH OTHER PEOPLE: NOT DIFFICULT AT ALL
SUM OF ALL RESPONSES TO PHQ QUESTIONS 1-9: 1

## 2025-04-22 ASSESSMENT — SOCIAL DETERMINANTS OF HEALTH (SDOH): HOW OFTEN DO YOU GET TOGETHER WITH FRIENDS OR RELATIVES?: TWICE A WEEK

## 2025-04-22 NOTE — PROGRESS NOTES
{PROVIDER CHARTING PREFERENCE:579814}    Dianna Ervin is a 64 year old, presenting for the following health issues:  No chief complaint on file.        4/22/2025    11:20 AM   Additional Questions   Roomed by Dolores PIERRE MA   Accompanied by self         4/22/2025    11:20 AM   Patient Reported Additional Medications   Patient reports taking the following new medications none     HPI        Hypertension Follow-up    Do you check your blood pressure regularly outside of the clinic? { :518847}   Are you following a low salt diet? { :362222}  Are your blood pressures ever more than 140 on the top number (systolic) OR more   than 90 on the bottom number (diastolic), for example 140/90? { :050228}    BP Readings from Last 2 Encounters:   12/17/24 (!) 143/83   04/23/24 (!) 150/70         {ROS Picklists (Optional):228111}      Objective    LMP  (LMP Unknown)   There is no height or weight on file to calculate BMI.  Physical Exam   {Exam List (Optional):666584}    {Diagnostic Test Results (Optional):123382}        Signed Electronically by: Yen Torres MD  {Email feedback regarding this note to [email or web address]   :634269}  Answers submitted by the patient for this visit:  Patient Health Questionnaire (Submitted on 4/22/2025)  If you checked off any problems, how difficult have these problems made it for you to do your work, take care of things at home, or get along with other people?: Not difficult at all  PHQ9 TOTAL SCORE: 1  Patient Health Questionnaire (G7) (Submitted on 4/22/2025)  THOMPSON 7 TOTAL SCORE: 1

## 2025-04-22 NOTE — PROGRESS NOTES
Preventive Care Visit  [facility]  Yen Torres MD, Family Medicine  Apr 22, 2025  {Provider  Link to [facility] :[id number]}    Assessment & Plan     1. Routine general medical examination at a health care facility (Primary)  ***    2. Screening for cervical cancer  Plan:- HPV and Gynecologic Cytology Panel - Recommended Age 30-65 Years    3. Encounter for screening mammogram for malignant neoplasm of breast  - MA Screen Bilateral w/Azael; Future    4. Screen for colon cancer  Plan: colonoscopy overdue- since 3/2025  She is willing to do stool test only for now, if positive , will get colonoscopy  - COLOGUARD(EXACT SCIENCES); Future    5. Hypertension goal BP (blood pressure) < 140/90  Plan: home Blood pressure monitor  - amLODIPine (NORVASC) 5 MG tablet; Take 1 tablet (5 mg) by mouth daily.  Dispense: 90 tablet; Refill: 1  - Albumin Random Urine Quantitative with Creat Ratio; Future  - Albumin Random Urine Quantitative with Creat Ratio  - metoprolol succinate ER (TOPROL XL) 100 MG 24 hr tablet; Take 1 tablet (100 mg) by mouth daily.  Dispense: 90 tablet; Refill: 3  - Basic metabolic panel  (Ca, Cl, CO2, Creat, Gluc, K, Na, BUN); Future    6. Neck strain, initial encounter  Plan: moderate pain after sleeping on a large pillow   - Physical Therapy  Referral; Future    7. Hyperlipidemia LDL goal <100  Plan: diet controlled  Recheck in 12/2025- fasting lab denise appointment   - Lipid Profile (Chol, Trig, HDL, LDL calc); Future  - Apolipoprotein B; Future    8. Loud snoring  Plan:- Adult ENT  Referral; Future      {Patient advised of split billing (Optional):747108}        Counseling  Appropriate preventive services were addressed with this patient via screening, questionnaire, or discussion as appropriate for fall prevention, nutrition, physical activity, Tobacco-use cessation, social engagement, weight loss and cognition.  Checklist reviewing preventive services available has  been given to the patient.  Reviewed patient's diet, addressing concerns and/or questions.       {Follow-up (Optional):133274}    Dianna Ervin is a 64 year old, presenting for the following:  Physical        4/22/2025    11:20 AM   Additional Questions   Roomed by Dolores PIERRE MA   Accompanied by self         4/22/2025    11:20 AM   Patient Reported Additional Medications   Patient reports taking the following new medications none          HPI       Advance Care Planning  {The storyboard will display whether the patient has ACP docs on file. Hover over the Code section in the storyboard to access the ACP documents. :436340}          4/22/2025   General Health   How would you rate your overall physical health? Good   Feel stress (tense, anxious, or unable to sleep) Not at all         4/22/2025   Nutrition   Three or more servings of calcium each day? Yes   Diet: Low salt   How many servings of fruit and vegetables per day? (!) 2-3   How many sweetened beverages each day? 0-1         4/22/2025   Exercise   Days per week of moderate/strenous exercise 6 days   Average minutes spent exercising at this level 30 min         4/22/2025   Social Factors   Frequency of gathering with friends or relatives Twice a week   Worry food won't last until get money to buy more No   Food not last or not have enough money for food? No   Do you have housing? (Housing is defined as stable permanent housing and does not include staying ouside in a car, in a tent, in an abandoned building, in an overnight shelter, or couch-surfing.) Yes   Are you worried about losing your housing? No   Lack of transportation? No   Unable to get utilities (heat,electricity)? No         4/22/2025   Fall Risk   Fallen 2 or more times in the past year? No   Trouble with walking or balance? No          4/22/2025   Dental   Dentist two times every year? Yes       Today's PHQ-9 Score:       4/22/2025    11:15 AM   PHQ-9 SCORE   PHQ-9 Total Score MyChart 1 (Minimal  depression)   PHQ-9 Total Score 1        Patient-reported         4/22/2025   Substance Use   Alcohol more than 3/day or more than 7/wk No   Do you use any other substances recreationally? No     Social History     Tobacco Use    Smoking status: Never     Passive exposure: Never    Smokeless tobacco: Never   Vaping Use    Vaping status: Never Used   Substance Use Topics    Alcohol use: Yes     Comment: 2 glasses 3 times a week    Drug use: Never     {Provider  If there are gaps in the social history shown above, please follow the link to update and then refresh the note Link to Social and Substance History :119102}      10/19/2023   LAST FHS-7 RESULTS   1st degree relative breast or ovarian cancer No   Any relative bilateral breast cancer No   Any male have breast cancer No   Any ONE woman have BOTH breast AND ovarian cancer No   Any woman with breast cancer before 50yrs No   2 or more relatives with breast AND/OR ovarian cancer No   2 or more relatives with breast AND/OR bowel cancer No     {If any of the questions to the FHS7 are answered yes, consider referral for genetic counseling.    Additional indications for genetic referral include personal history of breast or ovarian cancer, genetic mutation in 1st degree relative which increases risk of breast cancer including BRCA1, BRCA2, ISAURA, PALB 2, TP53, CHEK2, PTEN, CDH1, STK11 (per ACS) and/or 1st degree relative with history of pancreatic or high-risk prostate cancer (per NCCN):789657}   {Mammogram Decision Support (Optional):302568}        4/22/2025   STI Screening   New sexual partner(s) since last STI/HIV test? No     History of abnormal Pap smear: { :860242}        Latest Ref Rng & Units 4/23/2024    10:11 AM 11/8/2022     5:13 PM 9/21/2021     2:17 PM   PAP / HPV   PAP  Negative for Intraepithelial Lesion or Malignancy (NILM)  Low-grade squamous intraepithelial lesion (LSIL) encompassing HPV/mild dysplasia/CIN1  Negative for Intraepithelial Lesion or  "Malignancy (NILM)    HPV 16 DNA Negative Negative  Negative  Negative    HPV 18 DNA Negative Negative  Negative  Negative    Other HR HPV Negative Negative  Positive  Negative      ASCVD Risk   The 10-year ASCVD risk score (Brennen WU, et al., 2019) is: 5.1%    Values used to calculate the score:      Age: 64 years      Sex: Female      Is Non- : No      Diabetic: No      Tobacco smoker: No      Systolic Blood Pressure: 143 mmHg      Is BP treated: Yes      HDL Cholesterol: 100 mg/dL      Total Cholesterol: 216 mg/dL    {Link to Fracture Risk Assessment Tool (Optional):028663}    {Provider  REQUIRED FOR AWV Use the storyboard to review patient history, after sections have been marked as reviewed, refresh note to capture documentation:862243}   Reviewed and updated as needed this visit by Provider                    {HISTORY OPTIONS (Optional):082379}    {ROS Picklists (Optional):867365}     Objective    Exam  LMP  (LMP Unknown)    Estimated body mass index is 23.28 kg/m  as calculated from the following:    Height as of 12/17/24: 1.727 m (5' 8\").    Weight as of 12/17/24: 69.4 kg (153 lb 1.6 oz).    Physical Exam  {Exam Choices (Optional):102082}        Signed Electronically by: Yen Torres MD  {Email feedback regarding this note to [email or web address]   :990414}  Answers submitted by the patient for this visit:  Patient Health Questionnaire (Submitted on 4/22/2025)  If you checked off any problems, how difficult have these problems made it for you to do your work, take care of things at home, or get along with other people?: Not difficult at all  PHQ9 TOTAL SCORE: 1  Patient Health Questionnaire (G7) (Submitted on 4/22/2025)  THOMPSON 7 TOTAL SCORE: 1    " "from the following:    Height as of 12/17/24: 1.727 m (5' 8\").    Weight as of this encounter: 69.4 kg (153 lb).    Physical Exam  GENERAL: alert and no distress  EYES: Eyes grossly normal to inspection, PERRL and conjunctivae and sclerae normal  HENT: ear canals and TM's normal, nose and mouth without ulcers or lesions  NECK: no adenopathy, no asymmetry, masses, or scars  RESP: lungs clear to auscultation - no rales, rhonchi or wheezes  BREAST: normal without masses, tenderness or nipple discharge and no palpable axillary masses or adenopathy  CV: regular rate and rhythm, normal S1 S2, no S3 or S4, no murmur, click or rub, no peripheral edema  ABDOMEN: soft, nontender, no hepatosplenomegaly, no masses and bowel sounds normal   (female) w/bimanual: normal female external genitalia, normal urethral meatus, normal vaginal mucosa, and normal cervix/adnexa/uterus without masses or discharge  MS: no gross musculoskeletal defects noted, no edema  SKIN: no suspicious lesions or rashes  NEURO: Normal strength and tone, mentation intact and speech normal  PSYCH: mentation appears normal, affect normal/bright        Signed Electronically by: Yen Torres MD    Answers submitted by the patient for this visit:  Patient Health Questionnaire (Submitted on 4/22/2025)  If you checked off any problems, how difficult have these problems made it for you to do your work, take care of things at home, or get along with other people?: Not difficult at all  PHQ9 TOTAL SCORE: 1  Patient Health Questionnaire (G7) (Submitted on 4/22/2025)  THOMPSON 7 TOTAL SCORE: 1    "

## 2025-04-22 NOTE — PATIENT INSTRUCTIONS
Patient Education     Recent Labs   Lab Test 12/17/24  0934 09/21/21  1537   CHOL 216* 255*   HDL, protects heart > 50 is excelent 100 110   LDL, bad cholesterol, < 100, even lower , 105* 125*   TRIG 56 101        The 10-year ASCVD risk score (Brennen WU, et al., 2019) is: 5.4%    Values used to calculate the score:      Age: 64 years      Sex: Female      Is Non- : No      Diabetic: No      Tobacco smoker: No      Systolic Blood Pressure: 148 mmHg      Is BP treated: Yes      HDL Cholesterol: 100 mg/dL      Total Cholesterol: 216 mg/dL    Preventive Care Advice   This is general advice given by our system to help you stay healthy. However, your care team may have specific advice just for you. Please talk to your care team about your preventive care needs.  Nutrition  Eat 5 or more servings of fruits and vegetables each day.  Try wheat bread, brown rice and whole grain pasta (instead of white bread, rice, and pasta).  Get enough calcium and vitamin D. Check the label on foods and aim for 100% of the RDA (recommended daily allowance).  Lifestyle  Exercise at least 150 minutes each week  (30 minutes a day, 5 days a week).  Do muscle strengthening activities 2 days a week. These help control your weight and prevent disease.  No smoking.  Wear sunscreen to prevent skin cancer.  Have a dental exam and cleaning every 6 months.  Yearly exams  See your health care team every year to talk about:  Any changes in your health.  Any medicines your care team has prescribed.  Preventive care, family planning, and ways to prevent chronic diseases.  Shots (vaccines)   HPV shots (up to age 26), if you've never had them before.  Hepatitis B shots (up to age 59), if you've never had them before.  COVID-19 shot: Get this shot when it's due.  Flu shot: Get a flu shot every year.  Tetanus shot: Get a tetanus shot every 10 years.  Pneumococcal, hepatitis A, and RSV shots: Ask your care team if you need these based on  your risk.  Shingles shot (for age 50 and up)  General health tests  Diabetes screening:  Starting at age 35, Get screened for diabetes at least every 3 years.  If you are younger than age 35, ask your care team if you should be screened for diabetes.  Cholesterol test: At age 39, start having a cholesterol test every 5 years, or more often if advised.  Bone density scan (DEXA): At age 50, ask your care team if you should have this scan for osteoporosis (brittle bones).  Hepatitis C: Get tested at least once in your life.  STIs (sexually transmitted infections)  Before age 24: Ask your care team if you should be screened for STIs.  After age 24: Get screened for STIs if you're at risk. You are at risk for STIs (including HIV) if:  You are sexually active with more than one person.  You don't use condoms every time.  You or a partner was diagnosed with a sexually transmitted infection.  If you are at risk for HIV, ask about PrEP medicine to prevent HIV.  Get tested for HIV at least once in your life, whether you are at risk for HIV or not.  Cancer screening tests  Cervical cancer screening: If you have a cervix, begin getting regular cervical cancer screening tests starting at age 21.  Breast cancer scan (mammogram): If you've ever had breasts, begin having regular mammograms starting at age 40. This is a scan to check for breast cancer.  Colon cancer screening: It is important to start screening for colon cancer at age 45.  Have a colonoscopy test every 10 years (or more often if you're at risk) Or, ask your provider about stool tests like a FIT test every year or Cologuard test every 3 years.  To learn more about your testing options, visit:   .  For help making a decision, visit:   https://bit.ly/xs19852.  Prostate cancer screening test: If you have a prostate, ask your care team if a prostate cancer screening test (PSA) at age 55 is right for you.  Lung cancer screening: If you are a current or former smoker ages  50 to 80, ask your care team if ongoing lung cancer screenings are right for you.  For informational purposes only. Not to replace the advice of your health care provider. Copyright   2023 [employer]. All rights reserved. Clinically reviewed by the [facility]. [employer] 429641 - REV 01/24.

## 2025-04-23 ENCOUNTER — PATIENT OUTREACH (OUTPATIENT)
Dept: CARE COORDINATION | Facility: CLINIC | Age: 65
End: 2025-04-23
Payer: COMMERCIAL

## 2025-04-23 LAB
CREAT UR-MCNC: 35 MG/DL
HPV HR 12 DNA CVX QL NAA+PROBE: NEGATIVE
HPV16 DNA CVX QL NAA+PROBE: NEGATIVE
HPV18 DNA CVX QL NAA+PROBE: NEGATIVE
HUMAN PAPILLOMA VIRUS FINAL DIAGNOSIS: NORMAL
MICROALBUMIN UR-MCNC: <12 MG/L
MICROALBUMIN/CREAT UR: NORMAL MG/G{CREAT}

## 2025-04-26 LAB
BKR AP ASSOCIATED HPV REPORT: NORMAL
BKR LAB AP GYN ADEQUACY: NORMAL
BKR LAB AP GYN INTERPRETATION: NORMAL
BKR LAB AP PREVIOUS ABNORMAL: NORMAL
PATH REPORT.COMMENTS IMP SPEC: NORMAL
PATH REPORT.COMMENTS IMP SPEC: NORMAL
PATH REPORT.RELEVANT HX SPEC: NORMAL

## 2025-04-30 ENCOUNTER — HOSPITAL ENCOUNTER (OUTPATIENT)
Dept: MAMMOGRAPHY | Facility: CLINIC | Age: 65
Discharge: HOME | End: 2025-04-30
Attending: FAMILY MEDICINE
Payer: COMMERCIAL

## 2025-04-30 DIAGNOSIS — Z12.31 ENCOUNTER FOR SCREENING MAMMOGRAM FOR MALIGNANT NEOPLASM OF BREAST: ICD-10-CM

## 2025-04-30 PROCEDURE — 77063 BREAST TOMOSYNTHESIS BI: CPT

## 2025-05-01 ENCOUNTER — TELEPHONE (OUTPATIENT)
Dept: FAMILY MEDICINE | Facility: CLINIC | Age: 65
End: 2025-05-01
Payer: COMMERCIAL

## 2025-05-01 NOTE — TELEPHONE ENCOUNTER
Order/Referral Request    Who is requesting: Yen Torres MD    Orders being requested: Physical Therapy     Reason service is needed/diagnosis: Neck Pain     When are orders needed by: Asap      Has this been discussed with Provider: Yes    Does patient have a preference on a Group/Provider/Facility? [facility] [id number], [place] [address]     Does patient have an appointment scheduled?: Yes, pt has been seen by a physical therapist, and has appt already scheduled  but needs a referral to continue.     Where to send orders: Fax    Could we [facility] this [facility] you [facility] or would you prefer to receive a phone call?:   Patient would prefer a phone call   Okay to leave a detailed message?: Yes at Cell number on file:    Telephone Information:   Mobile [phone]

## 2025-05-05 ENCOUNTER — PATIENT OUTREACH (OUTPATIENT)
Dept: FAMILY MEDICINE | Facility: CLINIC | Age: 65
End: 2025-05-05
Payer: COMMERCIAL

## 2025-05-05 DIAGNOSIS — Z98.890 HISTORY OF LOOP ELECTRICAL EXCISION PROCEDURE (LEEP): Primary | ICD-10-CM

## 2025-05-08 ENCOUNTER — PATIENT OUTREACH (OUTPATIENT)
Dept: CARE COORDINATION | Facility: CLINIC | Age: 65
End: 2025-05-08
Payer: COMMERCIAL

## 2025-08-18 ENCOUNTER — TRANSFERRED RECORDS (OUTPATIENT)
Dept: HEALTH INFORMATION MANAGEMENT | Facility: CLINIC | Age: 65
End: 2025-08-18
Payer: COMMERCIAL